# Patient Record
Sex: FEMALE | Race: WHITE | NOT HISPANIC OR LATINO | Employment: PART TIME | URBAN - METROPOLITAN AREA
[De-identification: names, ages, dates, MRNs, and addresses within clinical notes are randomized per-mention and may not be internally consistent; named-entity substitution may affect disease eponyms.]

---

## 2018-02-02 ENCOUNTER — OFFICE VISIT CONVERTED (OUTPATIENT)
Dept: PLASTIC SURGERY | Facility: CLINIC | Age: 38
End: 2018-02-02
Attending: PLASTIC SURGERY

## 2018-02-08 ENCOUNTER — PROCEDURE VISIT CONVERTED (OUTPATIENT)
Dept: PLASTIC SURGERY | Facility: CLINIC | Age: 38
End: 2018-02-08
Attending: PLASTIC SURGERY

## 2018-04-25 ENCOUNTER — OFFICE VISIT CONVERTED (OUTPATIENT)
Dept: INTERNAL MEDICINE | Facility: CLINIC | Age: 38
End: 2018-04-25
Attending: INTERNAL MEDICINE

## 2018-11-20 ENCOUNTER — OFFICE VISIT CONVERTED (OUTPATIENT)
Dept: INTERNAL MEDICINE | Facility: CLINIC | Age: 38
End: 2018-11-20
Attending: INTERNAL MEDICINE

## 2019-02-05 ENCOUNTER — CONVERSION ENCOUNTER (OUTPATIENT)
Dept: INTERNAL MEDICINE | Facility: CLINIC | Age: 39
End: 2019-02-05

## 2019-02-05 ENCOUNTER — OFFICE VISIT CONVERTED (OUTPATIENT)
Dept: INTERNAL MEDICINE | Facility: CLINIC | Age: 39
End: 2019-02-05
Attending: INTERNAL MEDICINE

## 2019-02-14 ENCOUNTER — HOSPITAL ENCOUNTER (OUTPATIENT)
Dept: OTHER | Facility: HOSPITAL | Age: 39
Discharge: HOME | End: 2019-02-14
Attending: INTERNAL MEDICINE

## 2019-02-22 ENCOUNTER — OFFICE VISIT CONVERTED (OUTPATIENT)
Dept: OTOLARYNGOLOGY | Facility: CLINIC | Age: 39
End: 2019-02-22
Attending: OTOLARYNGOLOGY

## 2019-03-20 LAB — FUNGUS CSF CULT: NORMAL

## 2019-04-02 ENCOUNTER — HOSPITAL ENCOUNTER (OUTPATIENT)
Dept: GENERAL RADIOLOGY | Facility: HOSPITAL | Age: 39
Discharge: HOME | End: 2019-04-02
Attending: OTOLARYNGOLOGY

## 2019-04-12 ENCOUNTER — OFFICE VISIT CONVERTED (OUTPATIENT)
Dept: OTOLARYNGOLOGY | Facility: CLINIC | Age: 39
End: 2019-04-12
Attending: OTOLARYNGOLOGY

## 2019-05-21 ENCOUNTER — OFFICE VISIT CONVERTED (OUTPATIENT)
Dept: INTERNAL MEDICINE | Facility: CLINIC | Age: 39
End: 2019-05-21
Attending: INTERNAL MEDICINE

## 2019-05-23 ENCOUNTER — HOSPITAL ENCOUNTER (OUTPATIENT)
Dept: LAB | Facility: HOSPITAL | Age: 39
Discharge: HOME | End: 2019-05-23
Attending: INTERNAL MEDICINE

## 2019-05-23 LAB
BASOPHILS # BLD AUTO: 0.05 10*3/UL (ref 0–0.2)
BASOPHILS NFR BLD AUTO: 1 % (ref 0–3)
CHOLEST SERPL-MCNC: 110 MG/DL (ref 107–200)
CHOLEST/HDLC SERPL: 2.2 {RATIO} (ref 3–6)
CONV ABS IMM GRAN: 0.01 10*3/UL (ref 0–0.2)
CONV IMMATURE GRAN: 0.2 % (ref 0–1.8)
DEPRECATED RDW RBC AUTO: 40 FL (ref 36.4–46.3)
EOSINOPHIL # BLD AUTO: 0.21 10*3/UL (ref 0–0.7)
EOSINOPHIL # BLD AUTO: 4.2 % (ref 0–7)
ERYTHROCYTE [DISTWIDTH] IN BLOOD BY AUTOMATED COUNT: 11.8 % (ref 11.7–14.4)
FOLATE SERPL-MCNC: 17.8 NG/ML (ref 4.8–20)
HBA1C MFR BLD: 13.2 G/DL (ref 12–16)
HCT VFR BLD AUTO: 39.4 % (ref 37–47)
HDLC SERPL-MCNC: 49 MG/DL (ref 40–60)
IRON SATN MFR SERPL: 20 % (ref 20–55)
IRON SERPL-MCNC: 79 UG/DL (ref 60–170)
LDLC SERPL CALC-MCNC: 51 MG/DL (ref 70–100)
LYMPHOCYTES # BLD AUTO: 1.24 10*3/UL (ref 1–5)
MCH RBC QN AUTO: 31.2 PG (ref 27–31)
MCHC RBC AUTO-ENTMCNC: 33.5 G/DL (ref 33–37)
MCV RBC AUTO: 93.1 FL (ref 81–99)
MONOCYTES # BLD AUTO: 0.28 10*3/UL (ref 0.2–1.2)
MONOCYTES NFR BLD AUTO: 5.7 % (ref 3–10)
NEUTROPHILS # BLD AUTO: 3.16 10*3/UL (ref 2–8)
NEUTROPHILS NFR BLD AUTO: 63.8 % (ref 30–85)
NRBC CBCN: 0 % (ref 0–0.7)
PLATELET # BLD AUTO: 230 10*3/UL (ref 130–400)
PMV BLD AUTO: 11 FL (ref 9.4–12.3)
RBC # BLD AUTO: 4.23 10*6/UL (ref 4.2–5.4)
T4 FREE SERPL-MCNC: 1.3 NG/DL (ref 0.9–1.8)
TIBC SERPL-MCNC: 398 UG/DL (ref 245–450)
TRANSFERRIN SERPL-MCNC: 278 MG/DL (ref 250–380)
TRIGL SERPL-MCNC: 49 MG/DL (ref 40–150)
TSH SERPL-ACNC: 1.29 M[IU]/L (ref 0.27–4.2)
VARIANT LYMPHS NFR BLD MANUAL: 25.1 % (ref 20–45)
VIT B12 SERPL-MCNC: 477 PG/ML (ref 211–911)
VLDLC SERPL-MCNC: 10 MG/DL (ref 5–37)
WBC # BLD AUTO: 4.95 10*3/UL (ref 4.8–10.8)

## 2019-08-16 ENCOUNTER — OFFICE VISIT CONVERTED (OUTPATIENT)
Dept: INTERNAL MEDICINE | Facility: CLINIC | Age: 39
End: 2019-08-16
Attending: INTERNAL MEDICINE

## 2019-08-16 ENCOUNTER — HOSPITAL ENCOUNTER (OUTPATIENT)
Dept: OTHER | Facility: HOSPITAL | Age: 39
Discharge: HOME | End: 2019-08-16
Attending: INTERNAL MEDICINE

## 2019-08-16 LAB
25(OH)D3 SERPL-MCNC: 58.3 NG/ML (ref 30–100)
ALBUMIN SERPL-MCNC: 5.2 G/DL (ref 3.5–5)
ALBUMIN/GLOB SERPL: 2 {RATIO} (ref 1.4–2.6)
ALP SERPL-CCNC: 47 U/L (ref 42–98)
ALT SERPL-CCNC: 13 U/L (ref 10–40)
AMYLASE SERPL-CCNC: 46 U/L (ref 30–110)
ANION GAP SERPL CALC-SCNC: 17 MMOL/L (ref 8–19)
AST SERPL-CCNC: 23 U/L (ref 15–50)
BASOPHILS # BLD AUTO: 0.05 10*3/UL (ref 0–0.2)
BASOPHILS NFR BLD AUTO: 1 % (ref 0–3)
BILIRUB SERPL-MCNC: 0.64 MG/DL (ref 0.2–1.3)
BUN SERPL-MCNC: 7 MG/DL (ref 5–25)
BUN/CREAT SERPL: 9 {RATIO} (ref 6–20)
CALCIUM SERPL-MCNC: 9.3 MG/DL (ref 8.7–10.4)
CHLORIDE SERPL-SCNC: 102 MMOL/L (ref 99–111)
CONV ABS IMM GRAN: 0.01 10*3/UL (ref 0–0.2)
CONV CO2: 25 MMOL/L (ref 22–32)
CONV IMMATURE GRAN: 0.2 % (ref 0–1.8)
CONV TOTAL PROTEIN: 7.8 G/DL (ref 6.3–8.2)
CREAT UR-MCNC: 0.8 MG/DL (ref 0.5–0.9)
CRP SERPL-MCNC: 0.3 MG/L (ref 0–5)
DEPRECATED RDW RBC AUTO: 39.7 FL (ref 36.4–46.3)
EOSINOPHIL # BLD AUTO: 0.14 10*3/UL (ref 0–0.7)
EOSINOPHIL # BLD AUTO: 2.9 % (ref 0–7)
ERYTHROCYTE [DISTWIDTH] IN BLOOD BY AUTOMATED COUNT: 11.5 % (ref 11.7–14.4)
ERYTHROCYTE [SEDIMENTATION RATE] IN BLOOD: 2 MM/H (ref 0–20)
FSH SERPL-ACNC: 8.8 M[IU]/ML
GFR SERPLBLD BASED ON 1.73 SQ M-ARVRAT: >60 ML/MIN/{1.73_M2}
GLOBULIN UR ELPH-MCNC: 2.6 G/DL (ref 2–3.5)
GLUCOSE SERPL-MCNC: 79 MG/DL (ref 65–99)
HCT VFR BLD AUTO: 40.4 % (ref 37–47)
HGB BLD-MCNC: 13.6 G/DL (ref 12–16)
LIPASE SERPL-CCNC: 25 U/L (ref 5–51)
LYMPHOCYTES # BLD AUTO: 1.46 10*3/UL (ref 1–5)
LYMPHOCYTES NFR BLD AUTO: 30.5 % (ref 20–45)
MCH RBC QN AUTO: 31.3 PG (ref 27–31)
MCHC RBC AUTO-ENTMCNC: 33.7 G/DL (ref 33–37)
MCV RBC AUTO: 93.1 FL (ref 81–99)
MONOCYTES # BLD AUTO: 0.23 10*3/UL (ref 0.2–1.2)
MONOCYTES NFR BLD AUTO: 4.8 % (ref 3–10)
NEUTROPHILS # BLD AUTO: 2.9 10*3/UL (ref 2–8)
NEUTROPHILS NFR BLD AUTO: 60.6 % (ref 30–85)
NRBC CBCN: 0 % (ref 0–0.7)
OSMOLALITY SERPL CALC.SUM OF ELEC: 287 MOSM/KG (ref 273–304)
PLATELET # BLD AUTO: 240 10*3/UL (ref 130–400)
PMV BLD AUTO: 10.8 FL (ref 9.4–12.3)
POTASSIUM SERPL-SCNC: 4.2 MMOL/L (ref 3.5–5.3)
RBC # BLD AUTO: 4.34 10*6/UL (ref 4.2–5.4)
SODIUM SERPL-SCNC: 140 MMOL/L (ref 135–147)
T4 FREE SERPL-MCNC: 1.3 NG/DL (ref 0.9–1.8)
TSH SERPL-ACNC: 1.87 M[IU]/L (ref 0.27–4.2)
WBC # BLD AUTO: 4.79 10*3/UL (ref 4.8–10.8)

## 2019-08-19 LAB
CONV ANTI NUCLEAR ANTIBODY WITH REFLEX: NEGATIVE
CONV CELIAC DISEASE AB-IGA: 193 MG/DL (ref 68–408)

## 2019-08-20 LAB — TTG IGA SER-ACNC: 0 U/ML (ref 0–3)

## 2019-09-05 ENCOUNTER — HOSPITAL ENCOUNTER (OUTPATIENT)
Dept: OTHER | Facility: HOSPITAL | Age: 39
Discharge: HOME | End: 2019-09-05
Attending: INTERNAL MEDICINE

## 2019-09-05 LAB
APPEARANCE UR: CLEAR
BILIRUB UR QL: NEGATIVE
COLOR UR: YELLOW
CONV COLLECTION SOURCE (UA): NORMAL
CONV UROBILINOGEN IN URINE BY AUTOMATED TEST STRIP: 0.2 {EHRLICHU}/DL (ref 0.1–1)
GLUCOSE UR QL: NEGATIVE MG/DL
HGB UR QL STRIP: NEGATIVE
KETONES UR QL STRIP: NEGATIVE MG/DL
LEUKOCYTE ESTERASE UR QL STRIP: NEGATIVE
NITRITE UR QL STRIP: NEGATIVE
PH UR STRIP.AUTO: 6.5 [PH] (ref 5–8)
PROT UR QL: NEGATIVE MG/DL
SP GR UR: 1.01 (ref 1–1.03)

## 2019-09-06 ENCOUNTER — HOSPITAL ENCOUNTER (OUTPATIENT)
Dept: OTHER | Facility: HOSPITAL | Age: 39
Discharge: HOME | End: 2019-09-06
Attending: INTERNAL MEDICINE

## 2019-09-06 LAB
C DIFF TOX B STL QL CT TISS CULT: NEGATIVE
CONV 027 TOXIN: NEGATIVE

## 2019-09-08 LAB — BACTERIA SPEC AEROBE CULT: NORMAL

## 2019-10-22 ENCOUNTER — OFFICE VISIT CONVERTED (OUTPATIENT)
Dept: GASTROENTEROLOGY | Facility: CLINIC | Age: 39
End: 2019-10-22
Attending: INTERNAL MEDICINE

## 2019-11-08 ENCOUNTER — HOSPITAL ENCOUNTER (OUTPATIENT)
Dept: GASTROENTEROLOGY | Facility: HOSPITAL | Age: 39
Setting detail: HOSPITAL OUTPATIENT SURGERY
Discharge: HOME | End: 2019-11-08
Attending: INTERNAL MEDICINE

## 2019-11-26 ENCOUNTER — OFFICE VISIT CONVERTED (OUTPATIENT)
Dept: GASTROENTEROLOGY | Facility: CLINIC | Age: 39
End: 2019-11-26
Attending: INTERNAL MEDICINE

## 2020-02-28 ENCOUNTER — OFFICE VISIT CONVERTED (OUTPATIENT)
Dept: INTERNAL MEDICINE | Facility: CLINIC | Age: 40
End: 2020-02-28
Attending: INTERNAL MEDICINE

## 2020-02-28 ENCOUNTER — CONVERSION ENCOUNTER (OUTPATIENT)
Dept: INTERNAL MEDICINE | Facility: CLINIC | Age: 40
End: 2020-02-28

## 2020-06-05 ENCOUNTER — TELEMEDICINE CONVERTED (OUTPATIENT)
Dept: INTERNAL MEDICINE | Facility: CLINIC | Age: 40
End: 2020-06-05
Attending: INTERNAL MEDICINE

## 2020-11-13 ENCOUNTER — OFFICE VISIT CONVERTED (OUTPATIENT)
Dept: INTERNAL MEDICINE | Facility: CLINIC | Age: 40
End: 2020-11-13
Attending: INTERNAL MEDICINE

## 2020-11-13 ENCOUNTER — HOSPITAL ENCOUNTER (OUTPATIENT)
Dept: OTHER | Facility: HOSPITAL | Age: 40
Discharge: HOME | End: 2020-11-13
Attending: INTERNAL MEDICINE

## 2020-11-15 LAB — BACTERIA SPEC AEROBE CULT: NORMAL

## 2020-11-23 LAB
Lab: NOT DETECTED
SPECIMEN SOURCE: NORMAL
UREAPLASMA UREALYTICUM: NOT DETECTED

## 2020-12-22 ENCOUNTER — OFFICE VISIT CONVERTED (OUTPATIENT)
Dept: ORTHOPEDIC SURGERY | Facility: CLINIC | Age: 40
End: 2020-12-22
Attending: ORTHOPAEDIC SURGERY

## 2020-12-26 ENCOUNTER — HOSPITAL ENCOUNTER (OUTPATIENT)
Dept: OTHER | Facility: HOSPITAL | Age: 40
Discharge: HOME | End: 2020-12-26
Attending: INTERNAL MEDICINE

## 2020-12-31 ENCOUNTER — OFFICE VISIT CONVERTED (OUTPATIENT)
Dept: INTERNAL MEDICINE | Facility: CLINIC | Age: 40
End: 2020-12-31
Attending: PHYSICIAN ASSISTANT

## 2020-12-31 ENCOUNTER — HOSPITAL ENCOUNTER (OUTPATIENT)
Dept: OTHER | Facility: HOSPITAL | Age: 40
Discharge: HOME | End: 2020-12-31
Attending: PHYSICIAN ASSISTANT

## 2021-01-01 LAB — SARS-COV-2 RNA SPEC QL NAA+PROBE: NOT DETECTED

## 2021-01-02 LAB — BACTERIA SPEC AEROBE CULT: NORMAL

## 2021-01-08 ENCOUNTER — OFFICE VISIT CONVERTED (OUTPATIENT)
Dept: INTERNAL MEDICINE | Facility: CLINIC | Age: 41
End: 2021-01-08
Attending: INTERNAL MEDICINE

## 2021-01-08 ENCOUNTER — HOSPITAL ENCOUNTER (OUTPATIENT)
Dept: OTHER | Facility: HOSPITAL | Age: 41
Discharge: HOME | End: 2021-01-08
Attending: INTERNAL MEDICINE

## 2021-01-19 ENCOUNTER — HOSPITAL ENCOUNTER (OUTPATIENT)
Dept: OTHER | Facility: HOSPITAL | Age: 41
Discharge: HOME | End: 2021-01-19
Attending: INTERNAL MEDICINE

## 2021-02-05 ENCOUNTER — OFFICE VISIT CONVERTED (OUTPATIENT)
Dept: INTERNAL MEDICINE | Facility: CLINIC | Age: 41
End: 2021-02-05
Attending: INTERNAL MEDICINE

## 2021-03-26 ENCOUNTER — CONVERSION ENCOUNTER (OUTPATIENT)
Dept: INTERNAL MEDICINE | Facility: CLINIC | Age: 41
End: 2021-03-26

## 2021-03-26 ENCOUNTER — OFFICE VISIT CONVERTED (OUTPATIENT)
Dept: INTERNAL MEDICINE | Facility: CLINIC | Age: 41
End: 2021-03-26
Attending: INTERNAL MEDICINE

## 2021-04-20 ENCOUNTER — HOSPITAL ENCOUNTER (OUTPATIENT)
Dept: GENERAL RADIOLOGY | Facility: HOSPITAL | Age: 41
Discharge: HOME | End: 2021-04-20
Attending: INTERNAL MEDICINE

## 2021-04-30 ENCOUNTER — HOSPITAL ENCOUNTER (OUTPATIENT)
Dept: GENERAL RADIOLOGY | Facility: HOSPITAL | Age: 41
Discharge: HOME | End: 2021-04-30
Attending: INTERNAL MEDICINE

## 2021-05-10 NOTE — H&P
History and Physical      Patient Name: Christal Mcduffie   Patient ID: [id number]   Sex: Female   YOB: 1980    Primary Care Provider: Liz Milligan MD   Referring Provider: Mari Leong DO    Visit Date: December 22, 2020    Provider: Castro Aiken MD   Location: [facility]   Location Address: [address] [place] [address]  [id number]   Location Phone: [phone]          Chief Complaint  · Left wrist pain       History Of Present Illness  Christal Mcduffie is a 40 year old /White female who presents today to [facility].      Patient presents today for an evaluation of left wrist pain. Her symptoms have been present for several weeks to months. Symptoms have gradually gotten worse. She localizes her pain around the base of her thumb around the MCP region and at the ulnar wrist. Patient denies any trauma or injury to her wrist. 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 Patient denies any conservative treatment for her left wrist. She denies any numbness and tingling to her wrist and hand.       Past Medical History  Acute maxillary sinusitis, recurrence not specified; Allergic Rhinitis; Anxiety; Chronic rhinosinusitis; Depression; Facial aging; GERD (gastroesophageal reflux disease); Irritable bowel syndrome; Keloid; Migraine Headaches; Myofascial muscle pain; Night sweats; Reflux         Past Surgical History  Breast augmentation surgery, bilateral; C-sections; Cesarian Section; Colonoscopy; D and C; EGD; Hysterectomy; Joint Surgery; Knee surgery; Metal implants         Medication List  buspirone 10 mg oral tablet; Calcium 500 500 mg calcium (1,250 mg) oral tablet; Pepcid 40 mg oral tablet; Prevacid oral; sertraline 50 mg oral tablet         Allergy List  NO KNOWN DRUG ALLERGIES; NO KNOWN DRUG ALLERGIES       Allergies Reconciled  Family Medical History  Heart Disease; Hypertension; Colon Cancer; Osteoporosis         Social  "History  Alcohol (Current some day); Alcohol Use (Current some day); Exercises regularly; Homemaker.; lives with children; lives with spouse; ; .; Recreational Drug Use (Never); Tobacco (Never); Working         Immunizations  Name Date Admin   Influenza 10/15/2019         Review of Systems  · Constitutional  o Denies  o : fever, chills, weight loss  · Cardiovascular  o Denies  o : chest pain, shortness of breath  · Gastrointestinal  o Denies  o : liver disease, heartburn, nausea, blood in stools  · Genitourinary  o Denies  o : painful urination, blood in urine  · Integument  o Denies  o : rash, itching  · Neurologic  o Denies  o : headache, weakness, loss of consciousness  · Musculoskeletal  o Denies  o : painful, swollen joints  · Psychiatric  o Denies  o : drug/alcohol addiction, anxiety, depression      Vitals  Date Time BP Position Site L\R Cuff Size HR RR TEMP (F) WT  HT  BMI kg/m2 BSA m2 O2 Sat FR L/min FiO2        12/22/2020 02:42 PM      107 - R   131lbs 8oz 5'  8\" 19.99 1.69 98 %            Physical Examination  · Constitutional  o Appearance  o : well developed, well-nourished, no obvious deformities present  · Head and Face  o Head  o :   § Inspection  § : normocephalic  o Face  o :   § Inspection  § : no facial lesions  · Eyes  o Conjunctivae  o : conjunctivae normal  o Sclerae  o : sclerae white  · Ears, Nose, Mouth and Throat  o Ears  o :   § External Ears  § : appearance within normal limits  § Hearing  § : intact  o Nose  o :   § External Nose  § : appearance normal  · Neck  o Inspection/Palpation  o : normal appearance  o Range of Motion  o : full range of motion  · Respiratory  o Respiratory Effort  o : breathing unlabored  o Inspection of Chest  o : normal appearance  o Auscultation of Lungs  o : no audible wheezing or rales  · Cardiovascular  o Heart  o : regular rate  · Gastrointestinal  o Abdominal Examination  o : soft and non-tender  · Skin and Subcutaneous Tissue  o General " Inspection  o : intact, no rashes  · Psychiatric  o General  o : Alert and oriented x3  o Judgement and Insight  o : judgment and insight intact  o Mood and Affect  o : mood normal, affect appropriate  · Left Wrist  o Inspection  o : Sensation grossly intact. Neurovascular intact. Skin intact. Tender to palpation of the ulnar styloid and ECU tendon. Pain with range of motion of the wrist. Full range of motion. Strength 5 out of 5. Tender to palpation of the thumb metacarpal. Negative grind test. Negative Finkelstein. Negative carpal tunnel testing. No swelling, skin discoloration or atrophy.   · In Office Procedures  o View  o : AP/LATERAL  o Site  o : Left wrist   o Indication  o : Left wrist pain   o Study  o : X-rays ordered, taken in the office, and reviewed today.  o Xray  o : No acute osseous abnormality. Mild ulnar/positive variance.   o Comparative Data  o : No comparative data found          Assessment  · Left wrist pain     719.43/M25.532  · Left Wrist/Thumb Tendinitis     726.90/M77.9      Plan  · Orders  o Wrist (Left) 2 views X-Ray [facility] ([id number]) - 719.43/M25.532 - 12/22/2020  · Medications  o Medications have been Reconciled  o Transition of Care or Provider Policy  · Instructions  o Dr. Aiken saw and examined the patient and agrees with plan.   o X-rays reviewed by Dr. Aiken.  o Reviewed the patient's Past Medical, Social, and Family history as well as the ROS at today's visit, no changes.  o Call or return if worsening symptoms.  o Follow Up PRN.  o This note was transcribed by Muriel Fabian. chica  o Discussed diagnosis and treatment options with the patient. Patient given a prescription for Moloxican 15mg daily. Patient was also given a wrist pain. If pain worsens or doesnt improve we will consider getting an MRI.             Electronically Signed by: Muriel Fabian-, Other -Author on December 31, 2020 10:22:24 AM  Electronically Co-signed by: Castro Aiken MD  -Reviewer on January 1, 2021 10:05:49 PM

## 2021-05-13 NOTE — PROGRESS NOTES
"   Progress Note      Patient Name: Christal Mcduffie   Patient ID: [id number]   Sex: Female   YOB: 1980    Primary Care Provider: Liz Milligan MD   Referring Provider: Mari Leong DO    Visit Date: June 5, 2020    Provider: Liz Milligan MD   Location: [facility]   Location Address: [address] [place] [address]  [id number]   Location Phone: [phone]          Chief Complaint  · \"im just doing a follow up\"      History Of Present Illness  Video Conferencing Visit  Christal Mcduffie is a 40 year old /White female who is presenting for evaluation via video conferencing via [employer]. Verbal consent obtained before beginning visit.   The following staff were present during this visit: Liz Milligan MD   Christal Mcduffie is a 40 year old /White female who presents for evaluation and treatment of:      states that she is doing well on zoloft 50mg  however still having a little trouble with breakthrough anxiety at times    states that her cramping is getting worse  she has been trying orlissa  she is working with her gynecologist about this         Past Medical History  Disease Name Date Onset Notes   Acute maxillary sinusitis, recurrence not specified 03/28/2016 will try amoxil + clinda   Allergic Rhinitis --  --    Anxiety --  --    Chronic rhinosinusitis --  --    Depression --  --    Facial aging --  --    GERD (gastroesophageal reflux disease) --  --    Irritable bowel syndrome --  --    Keloid --  --    Migraine Headaches --  --    Myofascial muscle pain --  --    Night sweats --  --          Past Surgical History  Procedure Name Date Notes   Breast augmentation surgery, bilateral --  --    C-sections 2009, 2013 2009 twins   Colonoscopy 2019 --    D and C 2008, 2003 --    EGD 2019 --    Knee surgery [id number] Right knee 6 surgeries         Medication List  Name Date Started Instructions   Calcium 500 500 mg calcium (1,250 mg) oral tablet  take 1 " tablet by oral route daily   Orilissa 150 mg oral tablet  take 1 tablet (150 mg) by oral route once daily at approximately the same time each day   Pepcid 40 mg oral tablet  take 1 tablet (40 mg) by oral route once daily at bedtime   Prevacid oral  --    sertraline 50 mg oral tablet 05/26/2020 TAKE 1 TABLET BY MOUTH EVERY DAY         Allergy List  Allergen Name Date Reaction Notes   NO KNOWN DRUG ALLERGIES --  --  --          Family Medical History  Disease Name Relative/Age Notes   Heart Disease Grandfather (paternal)/   --    Hypertension Mother/   --    Colon Cancer Grandfather (paternal)/   --          Social History  Finding Status Start/Stop Quantity Notes   Alcohol Current some day --/-- 3 drinks a week --    Exercises regularly --  --/-- --  --    lives with spouse --  --/-- --  --     --  --/-- --  --    Tobacco Never --/-- --  --    Working --  --/-- --  --          Immunizations  NameDate Admin Mfg Trade Name Lot Number Route Inj VIS Given VIS Publication   [id number]/15/2019 SKB Fluarix, quadrivalent, preservative free  NE NE     Comments:          Review of Systems  · Constitutional  o Denies  o : fever, fatigue, weight loss, weight gain  · Cardiovascular  o Denies  o : lower extremity edema, claudication, chest pressure, palpitations  · Respiratory  o Denies  o : shortness of breath, wheezing, cough, hemoptysis, dyspnea on exertion  · Gastrointestinal  o Denies  o : nausea, vomiting, diarrhea, constipation, abdominal pain      Physical Examination     Gen: well-nourished, no acute distress  HENT: atraumatic, normocephalic  Eyes: extraocular movements intact, no scleral icterus  Lung: breathing comfortably, no cough  Skin: no visible rash, no lesions  Neuro: grossly oriented to person, place, and time. no facial droop   Psych: normal mood and affect             Assessment  · Anxiety     300.02/F41.1  cont current meds  will add buspirone  · Endometriosis     617.9/N80.9  cont to work with  gyn  will try bentyl and see if it helps    Problems Reconciled  Plan  · Orders  o ACO-39: Current medications updated and reviewed () - - 06/05/2020  · Medications  o buspirone 10 mg oral tablet   SIG: take 1 tablet (10 mg) by oral route 2 times per day   DISP: (60) tablets with 0 refills  Prescribed on 06/05/2020     o dicyclomine 20 mg oral tablet   SIG: take 1 tablet (20 mg) by oral route 3 times per day as needed for cramping   DISP: (30) tablets with 0 refills  Prescribed on 06/05/2020     o Medications have been Reconciled  o Transition of Care or Provider Policy  · Instructions  o Patient was educated/instructed on their diagnosis, treatment and medications prior to discharge from the clinic today.            Electronically Signed by: Liz Milligan MD -Author on June 14, 2020 12:16:59 AM

## 2021-05-13 NOTE — PROGRESS NOTES
Progress Note      Patient Name: Christal Mcduffie   Patient ID: [id number]   Sex: Female   YOB: 1980    Primary Care Provider: Liz Milligan MD   Referring Provider: Mari Leong DO    Visit Date: November 13, 2020    Provider: Liz Milligan MD   Location: [facility]   Location Address: [address] [place] [address], [address]  [place], [place]  [id number]   Location Phone: [phone]          Chief Complaint  · Pt states she is here for a follow up after hysterectomy.       History Of Present Illness  Christal Mcduffie is a 40 year old /White female who presents for evaluation and treatment of:      states that her pain is much better    cellulitis  she was on flagyl and Levaquin  then she got bv after this    she then got granulation tissue  I discussed with her gynecologist who states that she feels it is related to a vaginal cuff issue and recommends silver nitrate again    she also has more discharge   she tried clindagel and metrogel but then she felt like her discharge was going back         Past Medical History  Disease Name Date Onset Notes   Acute maxillary sinusitis, recurrence not specified 03/28/2016 will try amoxil + clinda   Allergic Rhinitis --  --    Anxiety --  --    Chronic rhinosinusitis --  --    Depression --  --    Facial aging --  --    GERD (gastroesophageal reflux disease) --  --    Irritable bowel syndrome --  --    Keloid --  --    Migraine Headaches --  --    Myofascial muscle pain --  --    Night sweats --  --          Past Surgical History  Procedure Name Date Notes   Breast augmentation surgery, bilateral --  --    C-sections 2009, 2013 2009 twins   Colonoscopy 2019 --    D and C 2008, 2003 --    EGD 2019 --    Knee surgery [id number] Right knee 6 surgeries         Medication List  Name Date Started Instructions   buspirone 10 mg oral tablet 11/13/2020 take 1 tablet (10 mg) by oral route 2 times per day   Calcium 500 500 mg calcium (1,250 mg) oral  tablet  take 1 tablet by oral route daily   dicyclomine 20 mg oral tablet 06/05/2020 take 1 tablet (20 mg) by oral route 3 times per day as needed for cramping   Flagyl 500 mg oral tablet 11/20/2020 Take 1 tablet by mouth twice a day for 10 days   Pepcid 40 mg oral tablet  take 1 tablet (40 mg) by oral route once daily at bedtime   Prevacid oral  --    sertraline 50 mg oral tablet 08/21/2020 TAKE 1 TABLET BY MOUTH EVERY DAY         Allergy List  Allergen Name Date Reaction Notes   NO KNOWN DRUG ALLERGIES --  --  --        Allergies Reconciled  Family Medical History  Disease Name Relative/Age Notes   Heart Disease Grandfather (paternal)/   --    Hypertension Mother/   --    Colon Cancer Grandfather (paternal)/   --          Social History  Finding Status Start/Stop Quantity Notes   Alcohol Current some day --/-- 3 drinks a week --    Exercises regularly --  --/-- --  --    lives with spouse --  --/-- --  --     --  --/-- --  --    Tobacco Never --/-- --  --    Working --  --/-- --  --          Immunizations  NameDate Admin Mfg Trade Name Lot Number Route Inj VIS Given VIS Publication   [id number]/15/2019 SKB Fluarix, quadrivalent, preservative free  NE NE     Comments:          Review of Systems  · Constitutional  o Denies  o : fatigue, night sweats  · Eyes  o Denies  o : double vision, blurred vision  · HENT  o Denies  o : vertigo, recent head injury  · Breasts  o Denies  o : abnormal changes in breast size, additional breast symptoms except as noted in the HPI  · Cardiovascular  o Denies  o : chest pain, irregular heart beats  · Respiratory  o Denies  o : shortness of breath, productive cough  · Gastrointestinal  o Denies  o : nausea, vomiting  · Genitourinary  o Denies  o : dysuria, urinary retention  · Integument  o Denies  o : hair growth change, new skin lesions  · Neurologic  o Denies  o : altered mental status, seizures  · Musculoskeletal  o Denies  o : joint swelling, limitation of  "motion  · Endocrine  o Denies  o : cold intolerance, heat intolerance  · Heme-Lymph  o Denies  o : petechiae, lymph node enlargement or tenderness  · Allergic-Immunologic  o Denies  o : frequent illnesses      Vitals  Date Time BP Position Site L\R Cuff Size HR RR TEMP (F) WT  HT  BMI kg/m2 BSA m2 O2 Sat FR L/min FiO2 HC       11/13/2020 09:21 /60 Sitting    82 - R  97.5 126lbs 6oz 5'  8\" 19.22 1.66 100 %  21%          Physical Examination  · Constitutional  o Appearance  o : no acute distress, well-nourished  · Head and Face  o Head  o :   § Inspection  § : atraumatic, normocephalic  · Eyes  o Eyes  o : extraocular movements intact, no scleral icterus, no conjunctival injection  · Respiratory  o Respiratory Effort  o : breathing comfortably, symmetric chest rise  o Auscultation of Lungs  o : clear to asculatation bilaterally, no wheezes, rales, or rhonchii  · Cardiovascular  o Heart  o :   § Auscultation of Heart  § : regular rate and rhythm, no murmurs, rubs, or gallops  o Peripheral Vascular System  o :   § Extremities  § : no edema  · Neurologic  o Mental Status Examination  o :   § Orientation  § : grossly oriented to person, place and time  o Gait and Station  o :   § Gait Screening  § : normal gait  · Psychiatric  o General  o : normal mood and affect     Vaginal exam without cervix however there is approximately 1.5 cm worth of granulation tissue in the area where cervix would be it is red and friable after discussion with her gynecologist decided to silver nitrate this area which she tolerated well  There was also a significant amount of whitish-yellow discharge which was cultured exam was otherwise normal               Assessment  · Vaginal discharge     623.5/N89.8  Silver nitrate applied to granulation tissue  Swabs obtained  We will treat based on these results    Problems Reconciled  Plan  · Orders  o ACO-39: Current medications updated and reviewed (0814M, ) - - 11/13/2020  o Vaginal Screen " (Candida, Gardnerella & Trichomonas) (55810) - 623.5/N89.8 - 11/13/2020  o Vaginal culture (11136) - 623.5/N89.8 - 11/13/2020  o Ureaplasma and Mycoplasma PCR Genital Swab [facility] (32053) - 623.5/N89.8 - 11/13/2020  · Medications  o buspirone 10 mg oral tablet   SIG: take 1 tablet (10 mg) by oral route 2 times per day   DISP: (180) Tablet with 1 refills  Adjusted on 11/13/2020     o Medications have been Reconciled  o Transition of Care or Provider Policy  · Instructions  o Patient was educated/instructed on their diagnosis, treatment and medications prior to discharge from the clinic today.            Electronically Signed by: Liz Milligan MD -Author on December 13, 2020 02:33:01 PM

## 2021-05-14 VITALS
BODY MASS INDEX: 19.17 KG/M2 | WEIGHT: 126.5 LBS | HEIGHT: 68 IN | HEART RATE: 87 BPM | DIASTOLIC BLOOD PRESSURE: 86 MMHG | OXYGEN SATURATION: 97 % | SYSTOLIC BLOOD PRESSURE: 112 MMHG | TEMPERATURE: 97.6 F

## 2021-05-14 VITALS
SYSTOLIC BLOOD PRESSURE: 102 MMHG | TEMPERATURE: 96.8 F | HEIGHT: 68 IN | WEIGHT: 131.12 LBS | BODY MASS INDEX: 19.87 KG/M2 | RESPIRATION RATE: 16 BRPM | HEART RATE: 81 BPM | DIASTOLIC BLOOD PRESSURE: 62 MMHG | OXYGEN SATURATION: 98 %

## 2021-05-14 VITALS
OXYGEN SATURATION: 99 % | DIASTOLIC BLOOD PRESSURE: 80 MMHG | HEIGHT: 68 IN | TEMPERATURE: 98.9 F | RESPIRATION RATE: 16 BRPM | HEART RATE: 83 BPM | SYSTOLIC BLOOD PRESSURE: 124 MMHG | BODY MASS INDEX: 19.63 KG/M2 | WEIGHT: 129.5 LBS

## 2021-05-14 VITALS — BODY MASS INDEX: 19.93 KG/M2 | WEIGHT: 131.5 LBS | OXYGEN SATURATION: 98 % | HEART RATE: 107 BPM | HEIGHT: 68 IN

## 2021-05-14 VITALS
HEART RATE: 70 BPM | HEIGHT: 68 IN | BODY MASS INDEX: 19.76 KG/M2 | TEMPERATURE: 97.8 F | WEIGHT: 130.37 LBS | OXYGEN SATURATION: 100 % | DIASTOLIC BLOOD PRESSURE: 82 MMHG | SYSTOLIC BLOOD PRESSURE: 106 MMHG

## 2021-05-14 VITALS
HEIGHT: 68 IN | WEIGHT: 126.37 LBS | BODY MASS INDEX: 19.15 KG/M2 | TEMPERATURE: 97.5 F | SYSTOLIC BLOOD PRESSURE: 109 MMHG | DIASTOLIC BLOOD PRESSURE: 60 MMHG | OXYGEN SATURATION: 100 % | HEART RATE: 82 BPM

## 2021-05-14 NOTE — PROGRESS NOTES
"   Progress Note      Patient Name: Christal Mcduffie   Patient ID: [id number]   Sex: Female   YOB: 1980    Primary Care Provider: Liz Milligan MD   Referring Provider: Mari Leong DO    Visit Date: January 8, 2021    Provider: Liz Milligan MD   Location: [facility]   Location Address: [address] [place] [address]  [id number]   Location Phone: [phone]          Chief Complaint  · \"I am still having vaginal discharge\"      History Of Present Illness  Christal Mcduffie is a 40 year old /White female who presents for evaluation and treatment of:      vagianl discharge    states that things got better  however the discharge came back    the discharge didn't change at all after coming back  no new partners    abd pain is improved       Past Medical History  Disease Name Date Onset Notes   Acute maxillary sinusitis, recurrence not specified 03/28/2016 will try amoxil + clinda   Allergic Rhinitis --  --    Anxiety --  --    Chronic rhinosinusitis --  --    Depression --  --    Facial aging --  --    GERD (gastroesophageal reflux disease) --  --    Irritable bowel syndrome --  --    Keloid --  --    Migraine Headaches --  --    Myofascial muscle pain --  --    Night sweats --  --    Reflux --  --          Past Surgical History  Procedure Name Date Notes   Breast augmentation surgery, bilateral --  --    C-sections 2009, 2013 2009 twins   Cesarian Section --  --    Colonoscopy 2019 --    D and C 2008, 2003 --    EGD 2019 --    Hysterectomy --  --    Joint Surgery --  --    Knee surgery [id number] Right knee 6 surgeries   Metal implants --  --          Medication List  Name Date Started Instructions   buspirone 10 mg oral tablet 11/13/2020 take 1 tablet (10 mg) by oral route 2 times per day   Calcium 500 500 mg calcium (1,250 mg) oral tablet  take 1 tablet by oral route daily   magnesium 250 mg oral tablet  take 1 tablet by oral route daily   metronidazole 500 " "mg oral tablet 01/15/2021 Take 1 tablet by mouth twice a day for 10 days   Pepcid 40 mg oral tablet  take 1 tablet (40 mg) by oral route once daily at bedtime   Prevacid oral  --    sertraline 50 mg oral tablet 01/08/2021 TAKE 1 TABLET BY MOUTH EVERY DAY         Allergy List  Allergen Name Date Reaction Notes   NO KNOWN DRUG ALLERGIES --  --  --        Allergies Reconciled  Family Medical History  Disease Name Relative/Age Notes   Heart Disease Grandfather (paternal)/   --    Hypertension Mother/   --    Colon Cancer Grandfather (paternal)/   --    Osteoporosis Mother/   Mother         Social History  Finding Status Start/Stop Quantity Notes   Alcohol Current some day --/-- 3 drinks a week --    Alcohol Use Current some day --/-- --  drinks weekly, less than 1 drink per day, has been drinking for 11-20 years   Exercises regularly --  --/-- --  --    Homemaker. --  --/-- --  --    lives with children --  --/-- --  --    lives with spouse --  --/-- --  --     --  --/-- --  --    . --  --/-- --  --    Recreational Drug Use Never --/-- --  no   Tobacco Never --/-- --  never smoker   Working --  --/-- --  --          Immunizations  NameDate Admin Mfg Trade Name Lot Number Route Inj VIS Given VIS Publication   [id number]/15/2019 SKB Fluarix, quadrivalent, preservative free  NE NE     Comments:          Review of Systems  · Constitutional  o Denies  o : fever, fatigue, weight loss, weight gain  · Cardiovascular  o Denies  o : lower extremity edema, claudication, chest pressure, palpitations  · Respiratory  o Denies  o : shortness of breath, wheezing, frequent cough, hemoptysis, dyspnea on exertion  · Gastrointestinal  o Denies  o : nausea, vomiting, diarrhea, constipation, abdominal pain      Vitals  Date Time BP Position Site L\R Cuff Size HR RR TEMP (F) WT  HT  BMI kg/m2 BSA m2 O2 Sat FR L/min FiO2        11/13/2020 09:21 /60 Sitting    82 - R  97.5 126lbs 6oz 5'  8\" 19.22 1.66 100 %  21%  " "  12/22/2020 02:42 PM      107 - R   131lbs 8oz 5'  8\" 19.99 1.69 98 %      12/31/2020 09:31 /80 Sitting    83 - R 16 98.9 129lbs 8oz 5'  8\" 19.69 1.68 99 %      01/08/2021 08:49 /86 Sitting    87 - R  97.6 126lbs 8oz 5'  8\" 19.23 1.66 97 %  21%          Physical Examination  · Constitutional  o Appearance  o : no acute distress, well-nourished  · Head and Face  o Head  o :   § Inspection  § : atraumatic, normocephalic  · Eyes  o Eyes  o : extraocular movements intact, no scleral icterus, no conjunctival injection  · Respiratory  o Respiratory Effort  o : breathing comfortably, symmetric chest rise  o Auscultation of Lungs  o : clear to asculatation bilaterally, no wheezes, rales, or rhonchii  · Cardiovascular  o Heart  o :   § Auscultation of Heart  § : regular rate and rhythm, no murmurs, rubs, or gallops  o Peripheral Vascular System  o :   § Extremities  § : no edema  · Neurologic  o Mental Status Examination  o :   § Orientation  § : grossly oriented to person, place and time  o Gait and Station  o :   § Gait Screening  § : normal gait  · Psychiatric  o General  o : normal mood and affect     vaginal exam with area of erythematous, friable tissue around vaginal cuff, smaller and less friable since last visit    silver nitrate applied to this area in 2 passes of 5 seconds each           Assessment  · Vaginal discharge     623.5/N89.8  will reswab and retreat with silver nitrate    Problems Reconciled  Plan  · Orders  o ACO-39: Current medications updated and reviewed (, 1159F) - - 01/08/2021  o Vaginal Screen (Candida, Gardnerella & Trichomonas) (73701) - 623.5/N89.8 - 01/08/2021  · Medications  o sertraline 50 mg oral tablet   SIG: TAKE 1 TABLET BY MOUTH EVERY DAY   DISP: (90) Tablet with 1 refills  Refilled on 01/08/2021     o Medications have been Reconciled  o Transition of Care or Provider Policy  · Instructions  o Patient was educated/instructed on their diagnosis, treatment and " medications prior to discharge from the clinic today.  · Disposition  o 1 Month Follow Up            Electronically Signed by: Liz Milligan MD -Author on January 24, 2021 10:09:38 AM

## 2021-05-14 NOTE — PROGRESS NOTES
"   Progress Note      Patient Name: Christal Mcduffie   Patient ID: [id number]   Sex: Female   YOB: 1980    Primary Care Provider: Liz Milligan MD   Referring Provider: Mari Leong DO    Visit Date: February 5, 2021    Provider: Liz Milligan MD   [facility]: [facility]   Location Address: [address] [place] [address]  [id number]   Location Phone: [phone]          Chief Complaint  · f/u     \"for vaginal discharge\"       History Of Present Illness  Christal Mcduffie is a 40 year old /White female who presents for evaluation and treatment of:      chronic issues.    anxiety-  states that she does like the buspar in the AM  in the afternoon she is still dealing with it in the afternoon, she tried the buspar but it made her very tired    sHe is still noticing vaginal discharge  It is about the same as previous               Past Medical History  Disease Name Date Onset Notes   Acute maxillary sinusitis, recurrence not specified 03/28/2016 will try amoxil + clinda   Allergic Rhinitis --  --    Anxiety --  --    Chronic rhinosinusitis --  --    Depression --  --    Facial aging --  --    GERD (gastroesophageal reflux disease) --  --    Irritable bowel syndrome --  --    Keloid --  --    Migraine Headaches --  --    Myofascial muscle pain --  --    Night sweats --  --    Reflux --  --          Past Surgical History  Procedure Name Date Notes   Breast augmentation surgery, bilateral --  --    C-sections 2009, 2013 2009 twins   Cesarian Section --  --    Colonoscopy 2019 --    D and C 2008, 2003 --    EGD 2019 --    Hysterectomy --  --    Joint Surgery --  --    Knee surgery [id number] Right knee 6 surgeries   Metal implants --  --          Medication List  Name Date Started Instructions   buspirone 10 mg oral tablet 11/13/2020 take 1 tablet (10 mg) by oral route 2 times per day   Calcium 500 500 mg calcium (1,250 mg) oral tablet  take 1 tablet by oral route " "daily   magnesium 250 mg oral tablet  take 1 tablet by oral route daily   Pepcid 40 mg oral tablet  take 1 tablet (40 mg) by oral route once daily at bedtime   Prevacid oral  --    sertraline 50 mg oral tablet 01/08/2021 TAKE 1 TABLET BY MOUTH EVERY DAY         Allergy List  Allergen Name Date Reaction Notes   NO KNOWN DRUG ALLERGIES --  --  --        Allergies Reconciled  Family Medical History  Disease Name Relative/Age Notes   Heart Disease Grandfather (paternal)/   --    Hypertension Mother/   --    Colon Cancer Grandfather (paternal)/   --    Osteoporosis Mother/   Mother         Social History  Finding Status Start/Stop Quantity Notes   Alcohol Current some day --/-- 3 drinks a week --    Alcohol Use Current some day --/-- --  drinks weekly, less than 1 drink per day, has been drinking for 11-20 years   Exercises regularly --  --/-- --  --    Homemaker. --  --/-- --  --    lives with children --  --/-- --  --    lives with spouse --  --/-- --  --     --  --/-- --  --    . --  --/-- --  --    Recreational Drug Use Never --/-- --  no   Tobacco Never --/-- --  never smoker   Working --  --/-- --  --          Immunizations  NameDate Admin Mfg Trade Name Lot Number Route Inj VIS Given VIS Publication   Ylvgaxfds12/15/2019 SKB Fluarix, quadrivalent, preservative free  NE NE     Comments:          Vitals  Date Time BP Position Site L\R Cuff Size HR RR TEMP (F) WT  HT  BMI kg/m2 BSA m2 O2 Sat FR L/min FiO2 HC       11/13/2020 09:21 /60 Sitting    82 - R  97.5 126lbs 6oz 5'  8\" 19.22 1.66 100 %  21%    12/31/2020 09:31 /80 Sitting    83 - R 16 98.9 129lbs 8oz 5'  8\" 19.69 1.68 99 %      01/08/2021 08:49 /86 Sitting    87 - R  97.6 126lbs 8oz 5'  8\" 19.23 1.66 97 %  21%    02/05/2021 08:30 /62 Sitting    81 - R 16 96.8 131lbs 2oz 5'  8\" 19.94 1.69 98 %  21%          Physical Examination  · Constitutional  o Appearance  o : no acute distress, well-nourished  · Head and " Face  o Head  o :   § Inspection  § : atraumatic, normocephalic  · Eyes  o Eyes  o : extraocular movements intact, no scleral icterus, no conjunctival injection  · Respiratory  o Respiratory Effort  o : breathing comfortably, symmetric chest rise  o Auscultation of Lungs  o : clear to asculatation bilaterally, no wheezes, rales, or rhonchii  · Cardiovascular  o Heart  o :   § Auscultation of Heart  § : regular rate and rhythm, no murmurs, rubs, or gallops  o Peripheral Vascular System  o :   § Extremities  § : no edema  · Neurologic  o Mental Status Examination  o :   § Orientation  § : grossly oriented to person, place and time  o Gait and Station  o :   § Gait Screening  § : normal gait  · Psychiatric  o General  o : normal mood and affect     Vaginal exam with significant granulation tissue           Assessment  · Anxiety     300.02/F41.1  Doing okay but will try adding Wellbutrin and see how she does with this  · Vaginal discharge     623.5/N89.8  Will refer back to GYN as she continues to have significant granulation tissue    Problems Reconciled  Plan  · Orders  o ACO-14: Influenza immunization administered or previously received [facility] () - - 02/05/2021   previously administered  o ACO-39: Current medications updated and reviewed (, 1159F) - - 02/05/2021  · Medications  o Wellbutrin  mg oral tablet extended release 24 hr   SIG: take 1 tablet (150 mg) by oral route once daily   DISP: (90) Tablet with 0 refills  Prescribed on 02/05/2021     o Metrogel Vaginal 0.75 % vaginal gel   SIG: insert 1 applicatorful (37.5 mg) by vaginal route once daily at bedtime for 5 days   DISP: (1) Applicatorful with 0 refills  Prescribed on 02/05/2021     o Medications have been Reconciled  o Transition of Care or Provider Policy  · Instructions  o Patient was educated/instructed on their diagnosis, treatment and medications prior to discharge from the clinic today.  · Disposition  o 6 Week Follow  Up            Electronically Signed by: Liz Milligan MD -Author on March 20, 2021 07:22:36 PM

## 2021-05-14 NOTE — PROGRESS NOTES
Progress Note      Patient Name: Christal Mcduffie   Patient ID: [id number]   Sex: Female   YOB: 1980    Primary Care Provider: Liz Milligan MD   Referring Provider: Mari Leong DO    Visit Date: December 31, 2020    Provider: Lizette Corona PA-C   [facility]   Location Address: [address] [place] [address]  [id number]   Location Phone: [phone]          Chief Complaint  · sore throat      History Of Present Illness  Christal Mcduffie is a 40 year old /White female who presents for evaluation and treatment of:      headahces, bodyaches, upset stomach, diarrhea since getting COVID vaccine on 12/26/2020.  kids had a sore throat and fatigue a week prior to her developing symptoms.  She admits to a mild sore throat prior to receiving vaccine that day. Sore throat and body aches are worst symptom at this time and has worsened over the past day.  She is having a hard time swallowing anything because of the soreness in her throat.       Past Medical History  Disease Name Date Onset Notes   Acute maxillary sinusitis, recurrence not specified 03/28/2016 will try amoxil + clinda   Allergic Rhinitis --  --    Anxiety --  --    Chronic rhinosinusitis --  --    Depression --  --    Facial aging --  --    GERD (gastroesophageal reflux disease) --  --    Irritable bowel syndrome --  --    Keloid --  --    Migraine Headaches --  --    Myofascial muscle pain --  --    Night sweats --  --    Reflux --  --          Past Surgical History  Procedure Name Date Notes   Breast augmentation surgery, bilateral --  --    C-sections 2009, 2013 2009 twins   Cesarian Section --  --    Colonoscopy 2019 --    D and C 2008, 2003 --    EGD 2019 --    Hysterectomy --  --    Joint Surgery --  --    Knee surgery [id number] Right knee 6 surgeries   Metal implants --  --          Medication List  Name Date Started Instructions   buspirone 10 mg oral tablet 11/13/2020 take 1 tablet  (10 mg) by oral route 2 times per day   Calcium 500 500 mg calcium (1,250 mg) oral tablet  take 1 tablet by oral route daily   Pepcid 40 mg oral tablet  take 1 tablet (40 mg) by oral route once daily at bedtime   Prevacid oral  --    sertraline 50 mg oral tablet 08/21/2020 TAKE 1 TABLET BY MOUTH EVERY DAY         Allergy List  Allergen Name Date Reaction Notes   NO KNOWN DRUG ALLERGIES --  --  --    NO KNOWN DRUG ALLERGIES --  --  --          Family Medical History  Disease Name Relative/Age Notes   Heart Disease Grandfather (paternal)/   --    Hypertension Mother/   --    Colon Cancer Grandfather (paternal)/   --    Osteoporosis Mother/   Mother         Social History  Finding Status Start/Stop Quantity Notes   Alcohol Current some day --/-- 3 drinks a week --    Alcohol Use Current some day --/-- --  drinks weekly, less than 1 drink per day, has been drinking for 11-20 years   Exercises regularly --  --/-- --  --    Homemaker. --  --/-- --  --    lives with children --  --/-- --  --    lives with spouse --  --/-- --  --     --  --/-- --  --    . --  --/-- --  --    Recreational Drug Use Never --/-- --  no   Tobacco Never --/-- --  never smoker   Working --  --/-- --  --          Immunizations  NameDate Admin Mfg Trade Name Lot Number Route Inj VIS Given VIS Publication   Ernwsupqc00/15/2019 SK Fluarix, quadrivalent, preservative free  NE NE     Comments:          Review of Systems  · Constitutional  o Admits  o : fatigue  o Denies  o : fever, weight loss, weight gain  · HENT  o Admits  o : sore throat  · Cardiovascular  o Denies  o : lower extremity edema, claudication, chest pressure, palpitations  · Respiratory  o Denies  o : shortness of breath, wheezing, frequent cough, hemoptysis, dyspnea on exertion  · Gastrointestinal  o Denies  o : nausea, vomiting, diarrhea, constipation, abdominal pain      Vitals  Date Time BP Position Site L\R Cuff Size HR RR TEMP (F) WT  HT  BMI kg/m2 BSA m2 O2 Sat FR  "L/min FiO2 HC       02/28/2020 09:02 /72 Sitting    80 - R 18 98.1 124lbs 6oz 5'  8\" 18.91 1.65 100 %  21%    11/13/2020 09:21 /60 Sitting    82 - R  97.5 126lbs 6oz 5'  8\" 19.22 1.66 100 %  21%    12/31/2020 09:31 /80 Sitting    83 - R 16 98.9 129lbs 8oz 5'  8\" 19.69 1.68 99 %            Physical Examination  · Constitutional  o Appearance  o : no acute distress, well-nourished  · Head and Face  o Head  o :   § Inspection  § : atraumatic, normocephalic  · Eyes  o Eyes  o : extraocular movements intact, no scleral icterus, no conjunctival injection  · Ears, Nose, Mouth and Throat  o Ears  o :   § External Ears  § : normal  § Otoscopic Examination  § : tympanic membrane appearance within normal limits bilaterally  o Nose  o :   § Intranasal Exam  § : nares patent  o Oral Cavity  o :   § Oral Mucosa  § : moist mucous membranes  o Throat  o :   § Oropharynx  § : erythematous, no inflammation or lesions present, tonsils within normal limits  · Respiratory  o Respiratory Effort  o : breathing comfortably, symmetric chest rise  o Auscultation of Lungs  o : clear to asculatation bilaterally, no wheezes, rales, or rhonchii  · Cardiovascular  o Heart  o :   § Auscultation of Heart  § : regular rate and rhythm, no murmurs, rubs, or gallops  o Peripheral Vascular System  o :   § Extremities  § : no edema  · Lymphatic  o Neck  o : no lymphadenopathy present  · Skin and Subcutaneous Tissue  o General Inspection  o : no lesions present, no areas of discoloration, skin turgor normal  · Neurologic  o Mental Status Examination  o :   § Orientation  § : grossly oriented to person, place and time  o Gait and Station  o :   § Gait Screening  § : normal gait  · Psychiatric  o General  o : normal mood and affect          Results  · In-Office Procedures  o Lab procedure  § IOP - Rapid Strep (26534)   § Beta Strep Gp A Culture: Negative   § Internal Control Verified?: Yes   § IOP - Influenza A/B Test (58877)   § Influenza " A: Negative   § Influenza B: Negative   § Internal Control Verified?: Yes       Assessment  · Fatigue     780.79/R53.83  · Headache     784.0/R51  · Sore throat     462/J02.9  Strep negative in office, will send for culture. Will send in a short course of steroids to help with inflammation in throat while we wait for culture results. Symptoms could be viral etiology. Even though patient got covid vaccine, I have some concern that she may have gotten the virus prior to the vaccine, especially since her kids were sick a week prior. Covid swab done in office. Continue conservative treatment at this time. Watch closely for worsening or persistent fever, difficulty breathing, shortness of breath, altered mental status or other worrisome symptoms. Go to ER if these develop. Call for any questions or concerns.      Plan  · Orders  o ACO-14: Influenza immunization administered or previously received [facility] () - - 12/31/2020  o ACO-39: Current medications updated and reviewed (1159F, ) - - 12/31/2020  o [facility] NCOV2 (send-out) (61454) - 462/J02.9, 784.0/R51 - 12/31/2020  o Throat culture and sensitivity (25927) - 462/J02.9, 784.0/R51 - 12/31/2020  · Medications  o prednisone 20 mg oral tablet   SIG: take 2 tablets (40 mg) by oral route once daily for 3 days   DISP: (6) Tablet with 0 refills  Prescribed on 12/31/2020     o Medications have been Reconciled  o Transition of Care or Provider Policy  · Instructions  o Take all medications as prescribed/directed.  o Rest. Increase Fluids.  o Patient was educated/instructed on their diagnosis, treatment and medications prior to discharge from the clinic today.  o Patient instructed to seek medical attention urgently for new or worsening symptoms.  · Disposition  o Call or Return if symptoms worsen or persist.  o follow up as needed  o Medications sent electronically to pharmacy  o Discussed with Dr. Hughes            Electronically Signed by: Lizette Corona PA-C  -Author on December 31, 2020 12:22:07 PM

## 2021-05-14 NOTE — PROGRESS NOTES
"   Progress Note      Patient Name: Christal Mcduffie   Patient ID: [id number]   Sex: Female   YOB: 1980    Primary Care Provider: Liz Milligan MD   Referring Provider: Mari Leong DO    Visit Date: March 26, 2021    Provider: Liz Milligan MD   Location: [facility]   Location Address: [address] [place] [address]  [id number]   Location Phone: [phone]          Chief Complaint  · \"following up on Wellbutrin\"  · \"discuss stomach issues\"      History Of Present Illness  Christal Mcduffie is a 41 year old /White female who presents for evaluation and treatment of:      chronic issues    vaginal discharge-  states that surgical procedure for granulation tissue went well  no further discharge currently  I have reviewed these note from her gynecologist    anxiety-  she likes the Wellbutrin, feels like it doesn't help her feel as tense  still uses Zoloft  no chest pain, no trouble breathing  no SI  feels calmer in typical situations    botox for clenching and grinding of her jaw  she did it at [facility]    having pain in her left upper quadrant, more in epigastric region  it is a dull/burning pain that is constantly there         Past Medical History  Disease Name Date Onset Notes   Acute maxillary sinusitis, recurrence not specified 03/28/2016 will try amoxil + clinda   Allergic Rhinitis --  --    Anxiety --  --    Chronic rhinosinusitis --  --    Depression --  --    Facial aging --  --    GERD (gastroesophageal reflux disease) --  --    Irritable bowel syndrome --  --    Keloid --  --    Migraine Headaches --  --    Myofascial muscle pain --  --    Night sweats --  --    Reflux --  --          Past Surgical History  Procedure Name Date Notes   Breast augmentation surgery, bilateral --  --    C-sections 2009, 2013 2009 twins   Cesarian Section --  --    Colonoscopy 2019 --    D and C 2008, 2003 --    EGD 2019 --    Hysterectomy --  --    Joint Surgery --  --  " "  Knee surgery [id number] Right knee 6 surgeries   Metal implants --  --          Medication List  Name Date Started Instructions   buspirone 10 mg oral tablet 11/13/2020 take 1 tablet (10 mg) by oral route 2 times per day   Calcium 500 500 mg calcium (1,250 mg) oral tablet  take 1 tablet by oral route daily   magnesium 250 mg oral tablet  take 1 tablet by oral route daily   Pepcid 40 mg oral tablet  take 1 tablet (40 mg) by oral route once daily at bedtime   Prevacid oral  --    sertraline 50 mg oral tablet 01/08/2021 TAKE 1 TABLET BY MOUTH EVERY DAY   Wellbutrin  mg oral tablet extended release 24 hr 02/05/2021 take 1 tablet (150 mg) by oral route once daily         Allergy List  Allergen Name Date Reaction Notes   NO KNOWN DRUG ALLERGIES --  --  --        Allergies Reconciled  Family Medical History  Disease Name Relative/Age Notes   Heart Disease Grandfather (paternal)/   --    Hypertension Mother/   --    Colon Cancer Grandfather (paternal)/   --    Osteoporosis Mother/   Mother         Social History  Finding Status Start/Stop Quantity Notes   Alcohol Current some day --/-- 3 drinks a week --    Alcohol Use Current some day --/-- --  drinks weekly, less than 1 drink per day, has been drinking for 11-20 years   Exercises regularly --  --/-- --  --    Homemaker. --  --/-- --  --    lives with children --  --/-- --  --    lives with spouse --  --/-- --  --     --  --/-- --  --    . --  --/-- --  --    Recreational Drug Use Never --/-- --  no   Tobacco Never --/-- --  never smoker   Working --  --/-- --  --          Immunizations  NameDate Admin Mfg Trade Name Lot Number Route Inj VIS Given VIS Publication   Ksoashiej22/15/2019 SKB Fluarix, quadrivalent, preservative free  NE NE     Comments:          Vitals  Date Time BP Position Site L\R Cuff Size HR RR TEMP (F) WT  HT  BMI kg/m2 BSA m2 O2 Sat FR L/min FiO2 HC       12/31/2020 09:31 /80 Sitting    83 - R 16 98.9 129lbs 8oz 5'  8\" 19.69 " "1.68 99 %      01/08/2021 08:49 /86 Sitting    87 - R  97.6 126lbs 8oz 5'  8\" 19.23 1.66 97 %  21%    02/05/2021 08:30 /62 Sitting    81 - R 16 96.8 131lbs 2oz 5'  8\" 19.94 1.69 98 %  21%    03/26/2021 09:08 /82 Sitting    70 - R  97.8 130lbs 6oz 5'  8\" 19.82 1.68 100 %  21%          Physical Examination  · Constitutional  o Appearance  o : no acute distress, well-nourished  · Head and Face  o Head  o :   § Inspection  § : atraumatic, normocephalic  · Eyes  o Eyes  o : extraocular movements intact, no scleral icterus, no conjunctival injection  · Respiratory  o Respiratory Effort  o : breathing comfortably, symmetric chest rise  o Auscultation of Lungs  o : clear to asculatation bilaterally, no wheezes, rales, or rhonchii  · Cardiovascular  o Heart  o :   § Auscultation of Heart  § : regular rate and rhythm, no murmurs, rubs, or gallops  o Peripheral Vascular System  o :   § Extremities  § : no edema  · Gastrointestinal  o Abdominal Examination  o :   § Abdomen  § : slight tenderness to palpiation in epigastric region, increased bowel sounds  · Neurologic  o Mental Status Examination  o :   § Orientation  § : grossly oriented to person, place and time  o Gait and Station  o :   § Gait Screening  § : normal gait  · Psychiatric  o General  o : normal mood and affect          Assessment  · Anxiety     300.02/F41.1  doing great cont current meds  · GERD (gastroesophageal reflux disease)     530.81/K21.9  will start protonix for 3 months  · Screening for depression     V79.0/Z13.89  · Visit for screening mammogram     V76.12/Z12.31  · Vaginal discharge     623.5/N89.8  doing great post op  cont to montior    Problems Reconciled  Plan  · Orders  o ACO-18: Negative screen for clinical depression using a standardized tool () - V79.0/Z13.89 - 03/26/2021  o Screening Mammography; Bilateral 3D (29988, 64976, ) - V76.12/Z12.31 - 03/26/2021  o ACO-39: Current medications updated and reviewed (8167Y, " ) - - 03/26/2021  · Medications  o Protonix 40 mg oral tablet,delayed release (DR/EC)   SIG: take 1 tablet (40 mg) by oral route once daily   DISP: (90) Tablet with 0 refills  Prescribed on 03/26/2021     o Medications have been Reconciled  o Transition of Care or Provider Policy  · Instructions  o Depression Screen completed and scanned into the EMR under the designated folder within the patient's documents.  o Today's PHQ-9 result is _0__  o Patient was educated/instructed on their diagnosis, treatment and medications prior to discharge from the clinic today.  · Disposition  o 3 Month Follow Up            Electronically Signed by: Liz Milligan MD -Author on March 26, 2021 10:53:57 AM

## 2021-05-15 VITALS
SYSTOLIC BLOOD PRESSURE: 118 MMHG | TEMPERATURE: 98.1 F | WEIGHT: 124.37 LBS | RESPIRATION RATE: 18 BRPM | OXYGEN SATURATION: 100 % | HEIGHT: 68 IN | BODY MASS INDEX: 18.85 KG/M2 | DIASTOLIC BLOOD PRESSURE: 72 MMHG | HEART RATE: 80 BPM

## 2021-05-15 VITALS
HEIGHT: 68 IN | HEART RATE: 88 BPM | WEIGHT: 124.37 LBS | OXYGEN SATURATION: 100 % | DIASTOLIC BLOOD PRESSURE: 64 MMHG | RESPIRATION RATE: 16 BRPM | SYSTOLIC BLOOD PRESSURE: 128 MMHG | BODY MASS INDEX: 18.85 KG/M2 | TEMPERATURE: 97.9 F

## 2021-05-15 VITALS
HEIGHT: 68 IN | WEIGHT: 125.37 LBS | DIASTOLIC BLOOD PRESSURE: 88 MMHG | SYSTOLIC BLOOD PRESSURE: 118 MMHG | TEMPERATURE: 96.9 F | OXYGEN SATURATION: 100 % | BODY MASS INDEX: 19 KG/M2 | HEART RATE: 72 BPM

## 2021-05-15 VITALS
SYSTOLIC BLOOD PRESSURE: 116 MMHG | BODY MASS INDEX: 18.87 KG/M2 | DIASTOLIC BLOOD PRESSURE: 75 MMHG | WEIGHT: 124.5 LBS | HEIGHT: 68 IN

## 2021-05-15 VITALS
WEIGHT: 128 LBS | SYSTOLIC BLOOD PRESSURE: 112 MMHG | DIASTOLIC BLOOD PRESSURE: 76 MMHG | OXYGEN SATURATION: 100 % | HEART RATE: 82 BPM | BODY MASS INDEX: 19.4 KG/M2 | HEIGHT: 68 IN | TEMPERATURE: 97.6 F | RESPIRATION RATE: 15 BRPM

## 2021-05-15 VITALS
HEIGHT: 68 IN | WEIGHT: 128.37 LBS | SYSTOLIC BLOOD PRESSURE: 127 MMHG | DIASTOLIC BLOOD PRESSURE: 69 MMHG | BODY MASS INDEX: 19.46 KG/M2

## 2021-05-16 VITALS
TEMPERATURE: 97.9 F | HEIGHT: 68 IN | WEIGHT: 127 LBS | RESPIRATION RATE: 16 BRPM | SYSTOLIC BLOOD PRESSURE: 103 MMHG | OXYGEN SATURATION: 100 % | HEART RATE: 75 BPM | BODY MASS INDEX: 19.25 KG/M2 | DIASTOLIC BLOOD PRESSURE: 64 MMHG

## 2021-05-16 VITALS
DIASTOLIC BLOOD PRESSURE: 71 MMHG | WEIGHT: 120 LBS | BODY MASS INDEX: 18.19 KG/M2 | SYSTOLIC BLOOD PRESSURE: 112 MMHG | OXYGEN SATURATION: 98 % | HEART RATE: 80 BPM | HEIGHT: 68 IN

## 2021-05-16 VITALS
OXYGEN SATURATION: 98 % | SYSTOLIC BLOOD PRESSURE: 126 MMHG | WEIGHT: 128.25 LBS | HEART RATE: 81 BPM | HEIGHT: 68 IN | DIASTOLIC BLOOD PRESSURE: 76 MMHG | TEMPERATURE: 98.7 F | BODY MASS INDEX: 19.44 KG/M2 | RESPIRATION RATE: 16 BRPM

## 2021-05-16 VITALS
SYSTOLIC BLOOD PRESSURE: 110 MMHG | DIASTOLIC BLOOD PRESSURE: 72 MMHG | TEMPERATURE: 97.6 F | BODY MASS INDEX: 19.44 KG/M2 | HEART RATE: 77 BPM | WEIGHT: 128.25 LBS | OXYGEN SATURATION: 100 % | HEIGHT: 68 IN | RESPIRATION RATE: 16 BRPM

## 2021-05-16 VITALS
SYSTOLIC BLOOD PRESSURE: 92 MMHG | RESPIRATION RATE: 16 BRPM | BODY MASS INDEX: 18.72 KG/M2 | HEART RATE: 68 BPM | DIASTOLIC BLOOD PRESSURE: 58 MMHG | TEMPERATURE: 97.7 F | HEIGHT: 68 IN | WEIGHT: 123.5 LBS | OXYGEN SATURATION: 98 %

## 2021-08-18 ENCOUNTER — TELEMEDICINE (OUTPATIENT)
Dept: INTERNAL MEDICINE | Facility: CLINIC | Age: 41
End: 2021-08-18

## 2021-08-18 DIAGNOSIS — K21.9 GASTROESOPHAGEAL REFLUX DISEASE, UNSPECIFIED WHETHER ESOPHAGITIS PRESENT: Primary | ICD-10-CM

## 2021-08-18 DIAGNOSIS — Z13.220 SCREENING, LIPID: ICD-10-CM

## 2021-08-18 DIAGNOSIS — F41.9 ANXIETY: ICD-10-CM

## 2021-08-18 DIAGNOSIS — J30.9 ALLERGIC RHINITIS, UNSPECIFIED SEASONALITY, UNSPECIFIED TRIGGER: ICD-10-CM

## 2021-08-18 PROBLEM — G43.909 MIGRAINE: Status: ACTIVE | Noted: 2021-08-18

## 2021-08-18 PROCEDURE — 99214 OFFICE O/P EST MOD 30 MIN: CPT | Performed by: INTERNAL MEDICINE

## 2021-08-18 RX ORDER — BUPROPION HYDROCHLORIDE 150 MG/1
150 TABLET ORAL EVERY MORNING
Qty: 90 TABLET | Refills: 1 | Status: SHIPPED | OUTPATIENT
Start: 2021-08-18 | End: 2022-03-04 | Stop reason: SDUPTHER

## 2021-08-18 RX ORDER — FAMOTIDINE 40 MG/1
40 TABLET, FILM COATED ORAL DAILY
COMMUNITY
End: 2022-11-18

## 2021-08-18 RX ORDER — IBUPROFEN 200 MG
CAPSULE ORAL
COMMUNITY
End: 2023-02-24 | Stop reason: SDUPTHER

## 2021-08-18 RX ORDER — DEXLANSOPRAZOLE 60 MG/1
60 CAPSULE, DELAYED RELEASE ORAL DAILY
Qty: 90 CAPSULE | Refills: 0 | Status: SHIPPED | OUTPATIENT
Start: 2021-08-18 | End: 2021-11-16

## 2021-08-18 RX ORDER — BUSPIRONE HYDROCHLORIDE 10 MG/1
10 TABLET ORAL 3 TIMES DAILY
COMMUNITY
End: 2021-08-18 | Stop reason: SDUPTHER

## 2021-08-18 RX ORDER — BUPROPION HYDROCHLORIDE 150 MG/1
TABLET ORAL
COMMUNITY
Start: 2021-05-10 | End: 2021-08-18 | Stop reason: SDUPTHER

## 2021-08-18 RX ORDER — LANSOPRAZOLE 30 MG/1
30 CAPSULE, DELAYED RELEASE ORAL DAILY
COMMUNITY
End: 2022-02-25 | Stop reason: SDDI

## 2021-08-18 RX ORDER — BUSPIRONE HYDROCHLORIDE 10 MG/1
10 TABLET ORAL 3 TIMES DAILY
Qty: 270 TABLET | Refills: 0 | Status: SHIPPED | OUTPATIENT
Start: 2021-08-18 | End: 2022-01-13

## 2021-08-18 RX ORDER — PANTOPRAZOLE SODIUM 40 MG/1
40 TABLET, DELAYED RELEASE ORAL DAILY
COMMUNITY
Start: 2021-07-31 | End: 2021-11-09

## 2021-08-23 ENCOUNTER — DOCUMENTATION (OUTPATIENT)
Dept: INTERNAL MEDICINE | Facility: CLINIC | Age: 41
End: 2021-08-23

## 2021-08-23 ENCOUNTER — PRIOR AUTHORIZATION (OUTPATIENT)
Dept: INTERNAL MEDICINE | Facility: CLINIC | Age: 41
End: 2021-08-23

## 2021-08-23 RX ORDER — HYDROXYZINE HYDROCHLORIDE 25 MG/1
25 TABLET, FILM COATED ORAL
Qty: 30 TABLET | Refills: 2 | Status: SHIPPED | OUTPATIENT
Start: 2021-08-23 | End: 2021-09-22

## 2021-11-09 ENCOUNTER — OFFICE VISIT (OUTPATIENT)
Dept: INTERNAL MEDICINE | Facility: CLINIC | Age: 41
End: 2021-11-09

## 2021-11-09 VITALS
HEIGHT: 68 IN | OXYGEN SATURATION: 98 % | DIASTOLIC BLOOD PRESSURE: 72 MMHG | HEART RATE: 83 BPM | WEIGHT: 131.38 LBS | TEMPERATURE: 97.6 F | BODY MASS INDEX: 19.91 KG/M2 | SYSTOLIC BLOOD PRESSURE: 114 MMHG

## 2021-11-09 DIAGNOSIS — L23.89 ALLERGIC CONTACT DERMATITIS DUE TO OTHER AGENTS: Primary | ICD-10-CM

## 2021-11-09 PROCEDURE — 99213 OFFICE O/P EST LOW 20 MIN: CPT | Performed by: NURSE PRACTITIONER

## 2021-11-09 RX ORDER — METHYLPREDNISOLONE 4 MG/1
TABLET ORAL
Qty: 1 EACH | Refills: 0 | Status: SHIPPED | OUTPATIENT
Start: 2021-11-09 | End: 2021-12-15

## 2021-11-09 NOTE — PROGRESS NOTES
"Chief Complaint  Rash (on face and neck, started yesterday morning, has tried OTC medications with no success, extremely itchy)    Subjective          Christal Mcduffie presents to [facility]  History of Present Illness  She recently changed dryer sheets. Otherwise she has not changed moisturizers or other topical products. She denies previous issue with this. Onset yesterday. Worsening thought the day today. Severe itching. Denies swelling or airway, itchy throat, soa or wheezing.  She has tried benadryl, famotidine and xyzal  Objective   Vital Signs:   /72   Pulse 83   Temp 97.6 °F (36.4 °C) (Temporal)   Ht 172.7 cm (68\")   Wt 59.6 kg (131 lb 6 oz)   SpO2 98%   BMI 19.98 kg/m²     Physical Exam  Vitals and nursing note reviewed.   Constitutional:       General: She is not in acute distress.     Appearance: Normal appearance.   HENT:      Head: Normocephalic and atraumatic.      Right Ear: External ear normal.      Left Ear: External ear normal.      Nose: Nose normal.      Mouth/Throat:      Mouth: Mucous membranes are moist.   Eyes:      Conjunctiva/sclera: Conjunctivae normal.   Cardiovascular:      Rate and Rhythm: Normal rate and regular rhythm.      Pulses: Normal pulses.      Heart sounds: Normal heart sounds. No murmur heard.  No friction rub. No gallop.    Pulmonary:      Effort: Pulmonary effort is normal. No respiratory distress.      Breath sounds: No wheezing, rhonchi or rales.   Musculoskeletal:      Cervical back: Neck supple.      Right lower leg: No edema.      Left lower leg: No edema.   Skin:     General: Skin is warm and dry.      Findings: Rash (maculopapular rash of cheeks, neck and chest, erytnematous with indurated patches on neck) present.   Neurological:      General: No focal deficit present.      Mental Status: She is alert and oriented to person, place, and time.   Psychiatric:         Mood and Affect: Mood normal.         " Behavior: Behavior normal.        Result Review :          Procedures      Assessment and Plan    Diagnoses and all orders for this visit:    1. Allergic contact dermatitis due to other agents (Primary)  Assessment & Plan:  We will try Medrol Dosepak.  She will continue with famotidine and Xyzal daily for the next 5 days.  Discussed further testing if rash returns or does not resolve.      Other orders  -     methylPREDNISolone (MEDROL) 4 MG dose pack; Take as directed on package instructions.  Dispense: 1 each; Refill: 0            Follow Up   No follow-ups on file.  Patient was given instructions and counseling regarding her condition or for health maintenance advice. Please see specific information pulled into the AVS if appropriate.

## 2021-11-09 NOTE — ASSESSMENT & PLAN NOTE
We will try Medrol Dosepak.  She will continue with famotidine and Xyzal daily for the next 5 days.  Discussed further testing if rash returns or does not resolve.

## 2021-11-29 RX ORDER — DEXLANSOPRAZOLE 60 MG/1
CAPSULE, DELAYED RELEASE ORAL
Qty: 90 CAPSULE | Refills: 0 | OUTPATIENT
Start: 2021-11-29

## 2021-12-15 RX ORDER — PREDNISONE 20 MG/1
TABLET ORAL
Qty: 19 TABLET | Refills: 0 | Status: SHIPPED | OUTPATIENT
Start: 2021-12-15 | End: 2022-02-25 | Stop reason: SDDI

## 2021-12-22 ENCOUNTER — TELEPHONE (OUTPATIENT)
Dept: INTERNAL MEDICINE | Facility: CLINIC | Age: 41
End: 2021-12-22

## 2021-12-22 NOTE — TELEPHONE ENCOUNTER
Caller: Christal Mcduffie    Relationship: Self    Best call back number: [phone]    Requested Prescriptions: sertraline (ZOLOFT) 50 MG tablet  Requested Prescriptions      No prescriptions requested or ordered in this encounter        Pharmacy where request should be sent:    [facility] [place] [address] - [phone]  - [phone] FX  [phone]      Additional details provided by patient: NEEDS ASAP    Does the patient have less than a 3 day supply:  [x] Yes  [] No    Maddi Mustafa Rep   12/22/21 13:30 EST

## 2022-01-13 RX ORDER — BUSPIRONE HYDROCHLORIDE 10 MG/1
TABLET ORAL
Qty: 180 TABLET | Refills: 1 | Status: SHIPPED | OUTPATIENT
Start: 2022-01-13 | End: 2022-10-18 | Stop reason: SDUPTHER

## 2022-01-18 RX ORDER — BUSPIRONE HYDROCHLORIDE 10 MG/1
TABLET ORAL
Qty: 270 TABLET | Refills: 0 | OUTPATIENT
Start: 2022-01-18

## 2022-02-25 ENCOUNTER — OFFICE VISIT (OUTPATIENT)
Dept: INTERNAL MEDICINE | Facility: CLINIC | Age: 42
End: 2022-02-25

## 2022-02-25 VITALS
DIASTOLIC BLOOD PRESSURE: 72 MMHG | HEIGHT: 68 IN | RESPIRATION RATE: 18 BRPM | BODY MASS INDEX: 19.7 KG/M2 | SYSTOLIC BLOOD PRESSURE: 106 MMHG | WEIGHT: 130 LBS | HEART RATE: 92 BPM | OXYGEN SATURATION: 99 % | TEMPERATURE: 98.3 F

## 2022-02-25 DIAGNOSIS — Z12.31 ENCOUNTER FOR SCREENING MAMMOGRAM FOR BREAST CANCER: ICD-10-CM

## 2022-02-25 DIAGNOSIS — Z63.5 DIVORCED: ICD-10-CM

## 2022-02-25 DIAGNOSIS — K21.9 GASTROESOPHAGEAL REFLUX DISEASE, UNSPECIFIED WHETHER ESOPHAGITIS PRESENT: ICD-10-CM

## 2022-02-25 DIAGNOSIS — Z11.59 NEED FOR HEPATITIS C SCREENING TEST: ICD-10-CM

## 2022-02-25 DIAGNOSIS — Z13.220 SCREENING, LIPID: ICD-10-CM

## 2022-02-25 DIAGNOSIS — F41.9 ANXIETY: Primary | ICD-10-CM

## 2022-02-25 DIAGNOSIS — F41.9 ANXIETY: ICD-10-CM

## 2022-02-25 PROBLEM — J30.9 ALLERGIC RHINITIS: Status: RESOLVED | Noted: 2021-08-18 | Resolved: 2022-02-25

## 2022-02-25 LAB
ALBUMIN SERPL-MCNC: 4.6 G/DL (ref 3.5–5.2)
ALBUMIN/GLOB SERPL: 1.6 G/DL
ALP SERPL-CCNC: 51 U/L (ref 39–117)
ALT SERPL W P-5'-P-CCNC: 24 U/L (ref 1–33)
ANION GAP SERPL CALCULATED.3IONS-SCNC: 11 MMOL/L (ref 5–15)
AST SERPL-CCNC: 24 U/L (ref 1–32)
BASOPHILS # BLD AUTO: 0.04 10*3/MM3 (ref 0–0.2)
BASOPHILS NFR BLD AUTO: 1 % (ref 0–1.5)
BILIRUB SERPL-MCNC: 0.3 MG/DL (ref 0–1.2)
BUN SERPL-MCNC: 10 MG/DL (ref 6–20)
BUN/CREAT SERPL: 9.1 (ref 7–25)
CALCIUM SPEC-SCNC: 10.6 MG/DL (ref 8.6–10.5)
CHLORIDE SERPL-SCNC: 103 MMOL/L (ref 98–107)
CHOLEST SERPL-MCNC: 153 MG/DL (ref 0–200)
CO2 SERPL-SCNC: 26 MMOL/L (ref 22–29)
CREAT SERPL-MCNC: 1.1 MG/DL (ref 0.57–1)
DEPRECATED RDW RBC AUTO: 39 FL (ref 37–54)
EOSINOPHIL # BLD AUTO: 0.17 10*3/MM3 (ref 0–0.4)
EOSINOPHIL NFR BLD AUTO: 4.2 % (ref 0.3–6.2)
ERYTHROCYTE [DISTWIDTH] IN BLOOD BY AUTOMATED COUNT: 12 % (ref 12.3–15.4)
GFR SERPL CREATININE-BSD FRML MDRD: 55 ML/MIN/1.73
GLOBULIN UR ELPH-MCNC: 2.8 GM/DL
GLUCOSE SERPL-MCNC: 106 MG/DL (ref 65–99)
HCT VFR BLD AUTO: 39.6 % (ref 34–46.6)
HCV AB SER DONR QL: NORMAL
HDLC SERPL-MCNC: 56 MG/DL (ref 40–60)
HGB BLD-MCNC: 13.6 G/DL (ref 12–15.9)
IMM GRANULOCYTES # BLD AUTO: 0.01 10*3/MM3 (ref 0–0.05)
IMM GRANULOCYTES NFR BLD AUTO: 0.2 % (ref 0–0.5)
LDLC SERPL CALC-MCNC: 85 MG/DL (ref 0–100)
LDLC/HDLC SERPL: 1.52 {RATIO}
LYMPHOCYTES # BLD AUTO: 1.22 10*3/MM3 (ref 0.7–3.1)
LYMPHOCYTES NFR BLD AUTO: 29.9 % (ref 19.6–45.3)
MCH RBC QN AUTO: 31.1 PG (ref 26.6–33)
MCHC RBC AUTO-ENTMCNC: 34.3 G/DL (ref 31.5–35.7)
MCV RBC AUTO: 90.6 FL (ref 79–97)
MONOCYTES # BLD AUTO: 0.41 10*3/MM3 (ref 0.1–0.9)
MONOCYTES NFR BLD AUTO: 10 % (ref 5–12)
NEUTROPHILS NFR BLD AUTO: 2.23 10*3/MM3 (ref 1.7–7)
NEUTROPHILS NFR BLD AUTO: 54.7 % (ref 42.7–76)
NRBC BLD AUTO-RTO: 0 /100 WBC (ref 0–0.2)
PLATELET # BLD AUTO: 257 10*3/MM3 (ref 140–450)
PMV BLD AUTO: 10.8 FL (ref 6–12)
POTASSIUM SERPL-SCNC: 5 MMOL/L (ref 3.5–5.2)
PROT SERPL-MCNC: 7.4 G/DL (ref 6–8.5)
RBC # BLD AUTO: 4.37 10*6/MM3 (ref 3.77–5.28)
SODIUM SERPL-SCNC: 140 MMOL/L (ref 136–145)
T4 FREE SERPL-MCNC: 1.21 NG/DL (ref 0.93–1.7)
TRIGL SERPL-MCNC: 59 MG/DL (ref 0–150)
TSH SERPL DL<=0.05 MIU/L-ACNC: 1.37 UIU/ML (ref 0.27–4.2)
VLDLC SERPL-MCNC: 12 MG/DL (ref 5–40)
WBC NRBC COR # BLD: 4.08 10*3/MM3 (ref 3.4–10.8)

## 2022-02-25 PROCEDURE — 99214 OFFICE O/P EST MOD 30 MIN: CPT | Performed by: INTERNAL MEDICINE

## 2022-02-25 PROCEDURE — 80053 COMPREHEN METABOLIC PANEL: CPT | Performed by: INTERNAL MEDICINE

## 2022-02-25 PROCEDURE — 86803 HEPATITIS C AB TEST: CPT | Performed by: INTERNAL MEDICINE

## 2022-02-25 PROCEDURE — 84439 ASSAY OF FREE THYROXINE: CPT | Performed by: INTERNAL MEDICINE

## 2022-02-25 PROCEDURE — 80061 LIPID PANEL: CPT | Performed by: INTERNAL MEDICINE

## 2022-02-25 PROCEDURE — 36415 COLL VENOUS BLD VENIPUNCTURE: CPT | Performed by: INTERNAL MEDICINE

## 2022-02-25 PROCEDURE — 85025 COMPLETE CBC W/AUTO DIFF WBC: CPT | Performed by: INTERNAL MEDICINE

## 2022-02-25 PROCEDURE — 84443 ASSAY THYROID STIM HORMONE: CPT | Performed by: INTERNAL MEDICINE

## 2022-02-25 RX ORDER — MOMETASONE FUROATE 1 MG/G
1 CREAM TOPICAL AS NEEDED
COMMUNITY
Start: 2022-02-10 | End: 2022-11-18

## 2022-02-25 RX ORDER — LEVOCETIRIZINE DIHYDROCHLORIDE 5 MG/1
5 TABLET, FILM COATED ORAL DAILY
COMMUNITY
Start: 2022-01-03 | End: 2023-02-24 | Stop reason: SDUPTHER

## 2022-02-25 SDOH — SOCIAL STABILITY - SOCIAL INSECURITY: DISRUPTION OF FAMILY BY SEPARATION AND DIVORCE: Z63.5

## 2022-02-25 NOTE — PROGRESS NOTES
"Chief Complaint  Follow-up (patient said you wanted to see her back in 6 months) for anxiety    Subjective          Christal Mcduffie presents to [facility]  History of Present Illness     Recently going through a divorce  States that she is feeling much better  Her anxiety is much better controlled  States that she is increasing her work schedule to go back to full time  She has been able to decrease her buspar usage to only a few times a week  Her kids are in counseling  She feels like co-parenting is going well    States that stomach is doing much better recently  She weaned down to pepcid once or twice a day    States that she is drinking less now as well    Objective   Vital Signs:   /72 (BP Location: Left arm, Patient Position: Sitting)   Pulse 92   Temp 98.3 °F (36.8 °C) (Oral)   Resp 18   Ht 172.7 cm (67.99\")   Wt 59 kg (130 lb)   SpO2 99%   BMI 19.77 kg/m²     Physical Exam  Vitals reviewed.   Constitutional:       Appearance: Normal appearance. She is well-developed.   HENT:      Head: Normocephalic and atraumatic.      Right Ear: External ear normal.      Left Ear: External ear normal.   Eyes:      Conjunctiva/sclera: Conjunctivae normal.      Pupils: Pupils are equal, round, and reactive to light.   Cardiovascular:      Rate and Rhythm: Normal rate and regular rhythm.      Heart sounds: No murmur heard.  No friction rub. No gallop.    Pulmonary:      Effort: Pulmonary effort is normal.      Breath sounds: Normal breath sounds. No wheezing or rhonchi.   Skin:     General: Skin is warm and dry.   Neurological:      Mental Status: She is alert and oriented to person, place, and time.      Cranial Nerves: No cranial nerve deficit.   Psychiatric:         Mood and Affect: Affect normal.         Behavior: Behavior normal.         Thought Content: Thought content normal.        Result Review :       Common labs    Common Labsle 2/25/22 2/25/22 " 2/25/22    1004 1004 1004   Glucose  106 (A)    BUN  10    Creatinine  1.10 (A)    eGFR Non African Am  55 (A)    Sodium  140    Potassium  5.0    Chloride  103    Calcium  10.6 (A)    Albumin  4.60    Total Bilirubin  0.3    Alkaline Phosphatase  51    AST (SGOT)  24    ALT (SGPT)  24    WBC 4.08     Hemoglobin 13.6     Hematocrit 39.6     Platelets 257     Total Cholesterol   153   Triglycerides   59   HDL Cholesterol   56   LDL Cholesterol    85   (A) Abnormal value              Results for orders placed or performed in visit on 02/25/22   Comprehensive metabolic panel    Specimen: Arm, Left; Blood   Result Value Ref Range    Glucose 106 (H) 65 - 99 mg/dL    BUN 10 6 - 20 mg/dL    Creatinine 1.10 (H) 0.57 - 1.00 mg/dL    Sodium 140 136 - 145 mmol/L    Potassium 5.0 3.5 - 5.2 mmol/L    Chloride 103 98 - 107 mmol/L    CO2 26.0 22.0 - 29.0 mmol/L    Calcium 10.6 (H) 8.6 - 10.5 mg/dL    Total Protein 7.4 6.0 - 8.5 g/dL    Albumin 4.60 3.50 - 5.20 g/dL    ALT (SGPT) 24 1 - 33 U/L    AST (SGOT) 24 1 - 32 U/L    Alkaline Phosphatase 51 39 - 117 U/L    Total Bilirubin 0.3 0.0 - 1.2 mg/dL    eGFR Non African Amer 55 (L) >60 mL/min/1.73    Globulin 2.8 gm/dL    A/G Ratio 1.6 g/dL    BUN/Creatinine Ratio 9.1 7.0 - 25.0    Anion Gap 11.0 5.0 - 15.0 mmol/L   TSH    Specimen: Arm, Left; Blood   Result Value Ref Range    TSH 1.370 0.270 - 4.200 uIU/mL   T4, free    Specimen: Arm, Left; Blood   Result Value Ref Range    Free T4 1.21 0.93 - 1.70 ng/dL   Lipid panel    Specimen: Arm, Left; Blood   Result Value Ref Range    Total Cholesterol 153 0 - 200 mg/dL    Triglycerides 59 0 - 150 mg/dL    HDL Cholesterol 56 40 - 60 mg/dL    LDL Cholesterol  85 0 - 100 mg/dL    VLDL Cholesterol 12 5 - 40 mg/dL    LDL/HDL Ratio 1.52    Hepatitis C Antibody    Specimen: Arm, Left; Blood   Result Value Ref Range    Hepatitis C Ab Non-Reactive Non-Reactive   CBC Auto Differential    Specimen: Arm, Left; Blood   Result Value Ref Range    WBC 4.08  3.40 - 10.80 10*3/mm3    RBC 4.37 3.77 - 5.28 10*6/mm3    Hemoglobin 13.6 12.0 - 15.9 g/dL    Hematocrit 39.6 34.0 - 46.6 %    MCV 90.6 79.0 - 97.0 fL    MCH 31.1 26.6 - 33.0 pg    MCHC 34.3 31.5 - 35.7 g/dL    RDW 12.0 (L) 12.3 - 15.4 %    RDW-SD 39.0 37.0 - 54.0 fl    MPV 10.8 6.0 - 12.0 fL    Platelets 257 140 - 450 10*3/mm3    Neutrophil % 54.7 42.7 - 76.0 %    Lymphocyte % 29.9 19.6 - 45.3 %    Monocyte % 10.0 5.0 - 12.0 %    Eosinophil % 4.2 0.3 - 6.2 %    Basophil % 1.0 0.0 - 1.5 %    Immature Grans % 0.2 0.0 - 0.5 %    Neutrophils, Absolute 2.23 1.70 - 7.00 10*3/mm3    Lymphocytes, Absolute 1.22 0.70 - 3.10 10*3/mm3    Monocytes, Absolute 0.41 0.10 - 0.90 10*3/mm3    Eosinophils, Absolute 0.17 0.00 - 0.40 10*3/mm3    Basophils, Absolute 0.04 0.00 - 0.20 10*3/mm3    Immature Grans, Absolute 0.01 0.00 - 0.05 10*3/mm3    nRBC 0.0 0.0 - 0.2 /100 WBC            Procedures        Assessment and Plan    Diagnoses and all orders for this visit:    1. Anxiety (Primary)  Comments:  doing really well on meds  Orders:  -     Comprehensive metabolic panel  -     CBC w AUTO Differential  -     TSH  -     T4, free  -     Lipid panel    2. Need for hepatitis C screening test  -     Hepatitis C Antibody; Future  -     Hepatitis C Antibody    3.   Comments:  feels like things are going better    4. Encounter for screening mammogram for breast cancer  -     Cancel: Mammo Screening Bilateral With CAD; Future  -     Mammo Screening Bilateral With CAD; Future    5. Gastroesophageal reflux disease, unspecified whether esophagitis present  Comments:  doing well as she has weaned down on meds  Orders:  -     Comprehensive metabolic panel  -     CBC w AUTO Differential  -     TSH  -     T4, free  -     Lipid panel    6. Anxiety  -     Comprehensive metabolic panel  -     CBC w AUTO Differential  -     TSH  -     T4, free  -     Lipid panel    7. Screening, lipid  -     Lipid panel    will draw labs and adjust as  needed            Follow Up   Return in about 3 months (around 5/25/2022).  Patient was given instructions and counseling regarding her condition or for health maintenance advice. Please see specific information pulled into the AVS if appropriate.

## 2022-03-04 RX ORDER — BUPROPION HYDROCHLORIDE 150 MG/1
150 TABLET ORAL EVERY MORNING
Qty: 90 TABLET | Refills: 1 | Status: SHIPPED | OUTPATIENT
Start: 2022-03-04 | End: 2022-09-27 | Stop reason: SDUPTHER

## 2022-05-06 ENCOUNTER — TELEPHONE (OUTPATIENT)
Dept: INTERNAL MEDICINE | Facility: CLINIC | Age: 42
End: 2022-05-06

## 2022-05-12 ENCOUNTER — HOSPITAL ENCOUNTER (OUTPATIENT)
Dept: MAMMOGRAPHY | Facility: HOSPITAL | Age: 42
Discharge: HOME | End: 2022-05-12
Admitting: INTERNAL MEDICINE

## 2022-05-12 DIAGNOSIS — Z12.31 ENCOUNTER FOR SCREENING MAMMOGRAM FOR BREAST CANCER: ICD-10-CM

## 2022-05-12 PROCEDURE — 77063 BREAST TOMOSYNTHESIS BI: CPT

## 2022-05-12 PROCEDURE — 77067 SCR MAMMO BI INCL CAD: CPT

## 2022-09-27 RX ORDER — BUPROPION HYDROCHLORIDE 150 MG/1
150 TABLET ORAL EVERY MORNING
Qty: 90 TABLET | Refills: 0 | Status: SHIPPED | OUTPATIENT
Start: 2022-09-27 | End: 2022-11-18 | Stop reason: SDUPTHER

## 2022-10-13 ENCOUNTER — DOCUMENTATION (OUTPATIENT)
Dept: OBSTETRICS AND GYNECOLOGY | Facility: CLINIC | Age: 42
End: 2022-10-13

## 2022-10-13 RX ORDER — HYDROCORTISONE ACETATE 25 MG/1
25 SUPPOSITORY RECTAL 2 TIMES DAILY
Qty: 60 SUPPOSITORY | Refills: 2 | Status: SHIPPED | OUTPATIENT
Start: 2022-10-13 | End: 2022-11-12

## 2022-10-20 RX ORDER — BUSPIRONE HYDROCHLORIDE 10 MG/1
10 TABLET ORAL 2 TIMES DAILY
Qty: 180 TABLET | Refills: 1 | OUTPATIENT
Start: 2022-10-20

## 2022-10-20 RX ORDER — BUSPIRONE HYDROCHLORIDE 10 MG/1
10 TABLET ORAL 2 TIMES DAILY
Qty: 180 TABLET | Refills: 1 | Status: SHIPPED | OUTPATIENT
Start: 2022-10-20 | End: 2023-02-24 | Stop reason: SDUPTHER

## 2022-11-18 ENCOUNTER — OFFICE VISIT (OUTPATIENT)
Dept: INTERNAL MEDICINE | Facility: CLINIC | Age: 42
End: 2022-11-18

## 2022-11-18 VITALS
BODY MASS INDEX: 20.07 KG/M2 | HEIGHT: 68 IN | OXYGEN SATURATION: 98 % | TEMPERATURE: 98.3 F | HEART RATE: 101 BPM | SYSTOLIC BLOOD PRESSURE: 120 MMHG | DIASTOLIC BLOOD PRESSURE: 74 MMHG | WEIGHT: 132.4 LBS

## 2022-11-18 DIAGNOSIS — H20.9 UVEITIS: ICD-10-CM

## 2022-11-18 DIAGNOSIS — E55.9 VITAMIN D DEFICIENCY: ICD-10-CM

## 2022-11-18 DIAGNOSIS — R19.7 DIARRHEA, UNSPECIFIED TYPE: ICD-10-CM

## 2022-11-18 DIAGNOSIS — F41.9 ANXIETY: Primary | ICD-10-CM

## 2022-11-18 PROBLEM — N83.209 CYST OF OVARY: Status: ACTIVE | Noted: 2022-11-18

## 2022-11-18 PROBLEM — N94.6 DYSMENORRHEA: Status: ACTIVE | Noted: 2022-11-18

## 2022-11-18 LAB
ALBUMIN SERPL-MCNC: 4.7 G/DL (ref 3.5–5.2)
ALBUMIN/GLOB SERPL: 1.7 G/DL
ALP SERPL-CCNC: 51 U/L (ref 39–117)
ALT SERPL W P-5'-P-CCNC: 12 U/L (ref 1–33)
ANION GAP SERPL CALCULATED.3IONS-SCNC: 8.1 MMOL/L (ref 5–15)
AST SERPL-CCNC: 22 U/L (ref 1–32)
BASOPHILS # BLD AUTO: 0.05 10*3/MM3 (ref 0–0.2)
BASOPHILS NFR BLD AUTO: 0.8 % (ref 0–1.5)
BILIRUB SERPL-MCNC: 0.3 MG/DL (ref 0–1.2)
BUN SERPL-MCNC: 18 MG/DL (ref 6–20)
BUN/CREAT SERPL: 16.5 (ref 7–25)
CALCIUM SPEC-SCNC: 10 MG/DL (ref 8.6–10.5)
CHLORIDE SERPL-SCNC: 101 MMOL/L (ref 98–107)
CHOLEST SERPL-MCNC: 123 MG/DL (ref 0–200)
CO2 SERPL-SCNC: 27.9 MMOL/L (ref 22–29)
CREAT SERPL-MCNC: 1.09 MG/DL (ref 0.57–1)
CRP SERPL-MCNC: <0.3 MG/DL (ref 0–0.5)
DEPRECATED RDW RBC AUTO: 37.2 FL (ref 37–54)
EGFRCR SERPLBLD CKD-EPI 2021: 65.6 ML/MIN/1.73
EOSINOPHIL # BLD AUTO: 0.31 10*3/MM3 (ref 0–0.4)
EOSINOPHIL NFR BLD AUTO: 5 % (ref 0.3–6.2)
ERYTHROCYTE [DISTWIDTH] IN BLOOD BY AUTOMATED COUNT: 11.7 % (ref 12.3–15.4)
ERYTHROCYTE [SEDIMENTATION RATE] IN BLOOD: 2 MM/HR (ref 0–20)
GLOBULIN UR ELPH-MCNC: 2.8 GM/DL
GLUCOSE SERPL-MCNC: 89 MG/DL (ref 65–99)
HCT VFR BLD AUTO: 39.2 % (ref 34–46.6)
HDLC SERPL-MCNC: 46 MG/DL (ref 40–60)
HGB BLD-MCNC: 13.7 G/DL (ref 12–15.9)
IMM GRANULOCYTES # BLD AUTO: 0.01 10*3/MM3 (ref 0–0.05)
IMM GRANULOCYTES NFR BLD AUTO: 0.2 % (ref 0–0.5)
LDLC SERPL CALC-MCNC: 60 MG/DL (ref 0–100)
LDLC/HDLC SERPL: 1.3 {RATIO}
LYMPHOCYTES # BLD AUTO: 1.73 10*3/MM3 (ref 0.7–3.1)
LYMPHOCYTES NFR BLD AUTO: 28.1 % (ref 19.6–45.3)
MCH RBC QN AUTO: 31.3 PG (ref 26.6–33)
MCHC RBC AUTO-ENTMCNC: 34.9 G/DL (ref 31.5–35.7)
MCV RBC AUTO: 89.5 FL (ref 79–97)
MONOCYTES # BLD AUTO: 0.44 10*3/MM3 (ref 0.1–0.9)
MONOCYTES NFR BLD AUTO: 7.2 % (ref 5–12)
NEUTROPHILS NFR BLD AUTO: 3.61 10*3/MM3 (ref 1.7–7)
NEUTROPHILS NFR BLD AUTO: 58.7 % (ref 42.7–76)
NRBC BLD AUTO-RTO: 0 /100 WBC (ref 0–0.2)
PLATELET # BLD AUTO: 244 10*3/MM3 (ref 140–450)
PMV BLD AUTO: 11.2 FL (ref 6–12)
POTASSIUM SERPL-SCNC: 4.2 MMOL/L (ref 3.5–5.2)
PROT SERPL-MCNC: 7.5 G/DL (ref 6–8.5)
RBC # BLD AUTO: 4.38 10*6/MM3 (ref 3.77–5.28)
SODIUM SERPL-SCNC: 137 MMOL/L (ref 136–145)
TRIGL SERPL-MCNC: 86 MG/DL (ref 0–150)
VLDLC SERPL-MCNC: 17 MG/DL (ref 5–40)
WBC NRBC COR # BLD: 6.15 10*3/MM3 (ref 3.4–10.8)

## 2022-11-18 PROCEDURE — 82306 VITAMIN D 25 HYDROXY: CPT | Performed by: INTERNAL MEDICINE

## 2022-11-18 PROCEDURE — 86258 DGP ANTIBODY EACH IG CLASS: CPT | Performed by: INTERNAL MEDICINE

## 2022-11-18 PROCEDURE — 86038 ANTINUCLEAR ANTIBODIES: CPT | Performed by: INTERNAL MEDICINE

## 2022-11-18 PROCEDURE — 80050 GENERAL HEALTH PANEL: CPT | Performed by: INTERNAL MEDICINE

## 2022-11-18 PROCEDURE — 82784 ASSAY IGA/IGD/IGG/IGM EACH: CPT | Performed by: INTERNAL MEDICINE

## 2022-11-18 PROCEDURE — 86364 TISS TRNSGLTMNASE EA IG CLAS: CPT | Performed by: INTERNAL MEDICINE

## 2022-11-18 PROCEDURE — 99214 OFFICE O/P EST MOD 30 MIN: CPT | Performed by: INTERNAL MEDICINE

## 2022-11-18 PROCEDURE — 86225 DNA ANTIBODY NATIVE: CPT | Performed by: INTERNAL MEDICINE

## 2022-11-18 PROCEDURE — 86140 C-REACTIVE PROTEIN: CPT | Performed by: INTERNAL MEDICINE

## 2022-11-18 PROCEDURE — 80061 LIPID PANEL: CPT | Performed by: INTERNAL MEDICINE

## 2022-11-18 PROCEDURE — 86231 EMA EACH IG CLASS: CPT | Performed by: INTERNAL MEDICINE

## 2022-11-18 PROCEDURE — 85652 RBC SED RATE AUTOMATED: CPT | Performed by: INTERNAL MEDICINE

## 2022-11-18 RX ORDER — BUPROPION HYDROCHLORIDE 150 MG/1
150 TABLET ORAL EVERY MORNING
Qty: 90 TABLET | Refills: 0 | Status: SHIPPED | OUTPATIENT
Start: 2022-11-18 | End: 2023-01-17 | Stop reason: SDUPTHER

## 2022-11-18 NOTE — PROGRESS NOTES
"Chief Complaint  Follow-up (6 month ), Heartburn (GERD, follow up), Anxiety (Follow up), emotions (6 month follow up, divorce), and Diarrhea (Dealing with it for a couple months now.)    Subjective          Christal Mcduffie presents to [facility]  History of Present Illness     only needing buspar every so often  Anxiety is well controlled on current meds    Completely off her GERD medicine    However now having lower GI issues, mostly diarrhea  It fluctuates; some urgency, some looseness and some increased frequcy    Right eye with some erythema that is coming and and going.  She has tried steroid drops that help.    Objective   Vital Signs:   /74 (BP Location: Right arm, Patient Position: Sitting, Cuff Size: Adult)   Pulse 101   Temp 98.3 °F (36.8 °C)   Ht 172.7 cm (67.99\")   Wt 60.1 kg (132 lb 6.4 oz)   SpO2 98%   BMI 20.14 kg/m²     Physical Exam  Vitals reviewed.   Constitutional:       Appearance: Normal appearance. She is well-developed.   HENT:      Head: Normocephalic and atraumatic.      Right Ear: External ear normal.      Left Ear: External ear normal.   Eyes:      Conjunctiva/sclera: Conjunctivae normal.      Pupils: Pupils are equal, round, and reactive to light.   Cardiovascular:      Rate and Rhythm: Normal rate and regular rhythm.      Heart sounds: No murmur heard.    No friction rub. No gallop.   Pulmonary:      Effort: Pulmonary effort is normal.      Breath sounds: Normal breath sounds. No wheezing or rhonchi.   Skin:     General: Skin is warm and dry.   Neurological:      Mental Status: She is alert and oriented to person, place, and time.   Psychiatric:         Mood and Affect: Affect normal.         Behavior: Behavior normal.         Thought Content: Thought content normal.        Result Review :       Common labs    Common Labs 2/25/22 2/25/22 2/25/22 11/18/22 11/18/22 11/18/22    1004 1004 1004 1535 1535 1535   Glucose  106 " (A)   89    BUN  10   18    Creatinine  1.10 (A)   1.09 (A)    eGFR Non  Am  55 (A)       Sodium  140   137    Potassium  5.0   4.2    Chloride  103   101    Calcium  10.6 (A)   10.0    Albumin  4.60   4.70    Total Bilirubin  0.3   0.3    Alkaline Phosphatase  51   51    AST (SGOT)  24   22    ALT (SGPT)  24   12    WBC 4.08   6.15     Hemoglobin 13.6   13.7     Hematocrit 39.6   39.2     Platelets 257   244     Total Cholesterol   153   123   Triglycerides   59   86   HDL Cholesterol   56   46   LDL Cholesterol    85   60   (A) Abnormal value              Results for orders placed or performed in visit on 11/18/22   Comprehensive Metabolic Panel    Specimen: Arm, Left; Blood   Result Value Ref Range    Glucose 89 65 - 99 mg/dL    BUN 18 6 - 20 mg/dL    Creatinine 1.09 (H) 0.57 - 1.00 mg/dL    Sodium 137 136 - 145 mmol/L    Potassium 4.2 3.5 - 5.2 mmol/L    Chloride 101 98 - 107 mmol/L    CO2 27.9 22.0 - 29.0 mmol/L    Calcium 10.0 8.6 - 10.5 mg/dL    Total Protein 7.5 6.0 - 8.5 g/dL    Albumin 4.70 3.50 - 5.20 g/dL    ALT (SGPT) 12 1 - 33 U/L    AST (SGOT) 22 1 - 32 U/L    Alkaline Phosphatase 51 39 - 117 U/L    Total Bilirubin 0.3 0.0 - 1.2 mg/dL    Globulin 2.8 gm/dL    A/G Ratio 1.7 g/dL    BUN/Creatinine Ratio 16.5 7.0 - 25.0    Anion Gap 8.1 5.0 - 15.0 mmol/L    eGFR 65.6 >60.0 mL/min/1.73   TSH    Specimen: Arm, Left; Blood   Result Value Ref Range    TSH 1.420 0.270 - 4.200 uIU/mL   Lipid Panel    Specimen: Arm, Left; Blood   Result Value Ref Range    Total Cholesterol 123 0 - 200 mg/dL    Triglycerides 86 0 - 150 mg/dL    HDL Cholesterol 46 40 - 60 mg/dL    LDL Cholesterol  60 0 - 100 mg/dL    VLDL Cholesterol 17 5 - 40 mg/dL    LDL/HDL Ratio 1.30    Sedimentation Rate    Specimen: Arm, Left; Blood   Result Value Ref Range    Sed Rate 2 0 - 20 mm/hr   Vitamin D,25-Hydroxy    Specimen: Arm, Left; Blood   Result Value Ref Range    25 Hydroxy, Vitamin D 172.0 (H) 30.0 - 100.0 ng/ml   KARISHMA    Specimen:  Arm, Left; Blood   Result Value Ref Range    dsDNA Negative Negative    Expanded LAURE Screen Negative Negative   C-reactive protein    Specimen: Arm, Left; Blood   Result Value Ref Range    C-Reactive Protein <0.30 0.00 - 0.50 mg/dL   CBC Auto Differential    Specimen: Arm, Left; Blood   Result Value Ref Range    WBC 6.15 3.40 - 10.80 10*3/mm3    RBC 4.38 3.77 - 5.28 10*6/mm3    Hemoglobin 13.7 12.0 - 15.9 g/dL    Hematocrit 39.2 34.0 - 46.6 %    MCV 89.5 79.0 - 97.0 fL    MCH 31.3 26.6 - 33.0 pg    MCHC 34.9 31.5 - 35.7 g/dL    RDW 11.7 (L) 12.3 - 15.4 %    RDW-SD 37.2 37.0 - 54.0 fl    MPV 11.2 6.0 - 12.0 fL    Platelets 244 140 - 450 10*3/mm3    Neutrophil % 58.7 42.7 - 76.0 %    Lymphocyte % 28.1 19.6 - 45.3 %    Monocyte % 7.2 5.0 - 12.0 %    Eosinophil % 5.0 0.3 - 6.2 %    Basophil % 0.8 0.0 - 1.5 %    Immature Grans % 0.2 0.0 - 0.5 %    Neutrophils, Absolute 3.61 1.70 - 7.00 10*3/mm3    Lymphocytes, Absolute 1.73 0.70 - 3.10 10*3/mm3    Monocytes, Absolute 0.44 0.10 - 0.90 10*3/mm3    Eosinophils, Absolute 0.31 0.00 - 0.40 10*3/mm3    Basophils, Absolute 0.05 0.00 - 0.20 10*3/mm3    Immature Grans, Absolute 0.01 0.00 - 0.05 10*3/mm3    nRBC 0.0 0.0 - 0.2 /100 WBC            Procedures        Assessment and Plan    Diagnoses and all orders for this visit:    1. Anxiety (Primary)  Comments:  well controlled, cont current meds  Orders:  -     Comprehensive Metabolic Panel  -     CBC & Differential  -     TSH  -     Lipid Panel  -     KARISHMA; Future  -     Sedimentation Rate  -     C-reactive protein; Future  -     Celiac Comprehensive Panel; Future  -     KARISHMA  -     C-reactive protein  -     Celiac Comprehensive Panel    2. Uveitis  Comments:  will check labs and cont to work with eye doctor  Orders:  -     Comprehensive Metabolic Panel  -     CBC & Differential  -     TSH  -     Lipid Panel  -     KARISHMA; Future  -     Sedimentation Rate  -     C-reactive protein; Future  -     Celiac Comprehensive Panel; Future  -      KARISHMA  -     C-reactive protein  -     Celiac Comprehensive Panel    3. Diarrhea, unspecified type  Comments:  unsure of etiology; discussed could be meds, anxiety, etc  Orders:  -     Comprehensive Metabolic Panel  -     CBC & Differential  -     TSH  -     Lipid Panel  -     KARISHMA; Future  -     Sedimentation Rate  -     C-reactive protein; Future  -     Celiac Comprehensive Panel; Future  -     KARISHMA  -     C-reactive protein  -     Celiac Comprehensive Panel    4. Vitamin D deficiency  Comments:  will check labs  Orders:  -     Vitamin D,25-Hydroxy    Other orders  -     sertraline (ZOLOFT) 50 MG tablet; Take 1 tablet by mouth Daily.  Dispense: 90 tablet; Refill: 1  -     buPROPion XL (Wellbutrin XL) 150 MG 24 hr tablet; Take 1 tablet by mouth Every Morning.  Dispense: 90 tablet; Refill: 0                Follow Up   Return in about 3 months (around 2/18/2023).  Patient was given instructions and counseling regarding her condition or for health maintenance advice. Please see specific information pulled into the AVS if appropriate.

## 2022-11-19 LAB
25(OH)D3 SERPL-MCNC: 172 NG/ML (ref 30–100)
TSH SERPL DL<=0.05 MIU/L-ACNC: 1.42 UIU/ML (ref 0.27–4.2)

## 2022-11-20 LAB
DSDNA IGG SERPL IA-ACNC: NEGATIVE [IU]/ML
NUCLEAR IGG SER IA-RTO: NEGATIVE

## 2022-11-21 LAB
ENDOMYSIUM IGA SER QL: NEGATIVE
GLIADIN PEPTIDE IGA SER-ACNC: 5 UNITS (ref 0–19)
GLIADIN PEPTIDE IGG SER-ACNC: 2 UNITS (ref 0–19)
IGA SERPL-MCNC: 245 MG/DL (ref 87–352)
TTG IGA SER-ACNC: <2 U/ML (ref 0–3)
TTG IGG SER-ACNC: <2 U/ML (ref 0–5)

## 2023-01-17 RX ORDER — BUPROPION HYDROCHLORIDE 150 MG/1
150 TABLET ORAL EVERY MORNING
Qty: 90 TABLET | Refills: 0 | Status: SHIPPED | OUTPATIENT
Start: 2023-01-17 | End: 2023-02-24 | Stop reason: SDUPTHER

## 2023-02-18 ENCOUNTER — HOSPITAL ENCOUNTER (EMERGENCY)
Facility: HOSPITAL | Age: 43
Discharge: HOME | End: 2023-02-18
Attending: EMERGENCY MEDICINE | Admitting: EMERGENCY MEDICINE
Payer: COMMERCIAL

## 2023-02-18 ENCOUNTER — APPOINTMENT (OUTPATIENT)
Dept: GENERAL RADIOLOGY | Facility: HOSPITAL | Age: 43
End: 2023-02-18
Payer: COMMERCIAL

## 2023-02-18 VITALS
SYSTOLIC BLOOD PRESSURE: 115 MMHG | WEIGHT: 135.36 LBS | OXYGEN SATURATION: 100 % | TEMPERATURE: 97.9 F | DIASTOLIC BLOOD PRESSURE: 82 MMHG | HEIGHT: 68 IN | HEART RATE: 83 BPM | RESPIRATION RATE: 15 BRPM | BODY MASS INDEX: 20.52 KG/M2

## 2023-02-18 DIAGNOSIS — S61.313A LACERATION OF LEFT MIDDLE FINGER WITHOUT FOREIGN BODY WITH DAMAGE TO NAIL, INITIAL ENCOUNTER: Primary | ICD-10-CM

## 2023-02-18 PROCEDURE — 99282 EMERGENCY DEPT VISIT SF MDM: CPT

## 2023-02-18 PROCEDURE — 73140 X-RAY EXAM OF FINGER(S): CPT

## 2023-02-18 PROCEDURE — 90715 TDAP VACCINE 7 YRS/> IM: CPT | Performed by: EMERGENCY MEDICINE

## 2023-02-18 PROCEDURE — 90471 IMMUNIZATION ADMIN: CPT | Performed by: EMERGENCY MEDICINE

## 2023-02-18 PROCEDURE — 25010000002 TETANUS-DIPHTH-ACELL PERTUSSIS 5-2.5-18.5 LF-MCG/0.5 SUSPENSION PREFILLED SYRINGE: Performed by: EMERGENCY MEDICINE

## 2023-02-18 RX ORDER — LIDOCAINE HYDROCHLORIDE 10 MG/ML
10 INJECTION, SOLUTION EPIDURAL; INFILTRATION; INTRACAUDAL; PERINEURAL ONCE
Status: COMPLETED | OUTPATIENT
Start: 2023-02-18 | End: 2023-02-18

## 2023-02-18 RX ORDER — CEPHALEXIN 500 MG/1
500 CAPSULE ORAL 2 TIMES DAILY
Qty: 14 CAPSULE | Refills: 0 | Status: SHIPPED | OUTPATIENT
Start: 2023-02-18 | End: 2023-02-25

## 2023-02-18 RX ADMIN — TETANUS TOXOID, REDUCED DIPHTHERIA TOXOID AND ACELLULAR PERTUSSIS VACCINE, ADSORBED 0.5 ML: 5; 2.5; 8; 8; 2.5 SUSPENSION INTRAMUSCULAR at 15:16

## 2023-02-18 RX ADMIN — LIDOCAINE HYDROCHLORIDE 10 ML: 10 INJECTION, SOLUTION EPIDURAL; INFILTRATION; INTRACAUDAL; PERINEURAL at 17:21

## 2023-02-18 NOTE — ED PROVIDER NOTES
Time: 3:13 PM EST  Date of encounter:  2023  Independent Historian/Clinical History and Information was obtained by: Patient and Chart  Chief Complaint: laceration  History is limited by: N/A  Room number: 55/55     History of Present Illness:  HPI     Patient is a 43 y.o. year old female who presents to the Emergency Department via private car for evaluation of laceration. Patient has family at bedside on initial evaluation. Patient has a medical history of anxiety, depression, migraine, GERD, IBS. Patient has a surgical history of  section and hysterectomy.    Patient started experiencing symptoms of laceration to the left hand on the end of the 3rd digit, but states she has no additional symptoms. Patient states the injury occurred approximately a couple hours ago after slamming her finger into a sliding shower door. Patient states they are in moderate pain and rates their pain level 4 out of 10 at rest and with movement or activity. Patient states no modifying factors. Patient denies all other symptoms at this time. All other systems reviews are negative.    Patient has not tried anything for symptom relief. Patients TDAP vaccination was not up-to-date.      Patient Care Team  Primary Care Provider: Liz Milligan MD    Past Medical History:    No Known Allergies  Past Medical History:   Diagnosis Date   • Acid reflux    • Acute maxillary sinusitis 2016    recurrence not specified; will try amoxil + clinda   • Allergic rhinitis    • Anxiety    • Chronic rhinosinusitis    • Depression    • Facial aging    • GERD (gastroesophageal reflux disease)    • Hx of migraine headaches    • IBS (irritable bowel syndrome)    • Keloid    • Myofascial muscle pain    • Night sweats      Past Surgical History:   Procedure Laterality Date   • BREAST AUGMENTATION Bilateral     surgery   •  SECTION      -2013   • COLONOSCOPY     • DILATATION AND CURETTAGE      2003   • ENDOSCOPY   "2019   • HYSTERECTOMY     • KNEE SURGERY Right     [id number], right knee 6 surgeries   • OTHER SURGICAL HISTORY      joint surgery; metal implants     Family History   Problem Relation Age of Onset   • Hypertension Mother    • Osteoporosis Mother    • Heart disease Paternal Grandfather    • Colon cancer Paternal Grandfather        Home Medications:  Prior to Admission medications    Medication Sig Start Date End Date Taking? Authorizing Provider   buPROPion XL (Wellbutrin XL) 150 MG 24 hr tablet Take 1 tablet by mouth Every Morning. 1/17/23   Liz Milligan MD   busPIRone (BUSPAR) 10 MG tablet Take 1 tablet by mouth 2 (Two) Times a Day. 10/20/22   Liz Milligan MD   calcium carbonate (OS-LATESHA) 1250 (500 Ca) MG tablet Calcium 500 500 mg calcium (1,250 mg) oral tablet take 1 tablet by oral route daily   Active    ProviderDelonte MD   levocetirizine (XYZAL) 5 MG tablet Take 5 mg by mouth Daily. 1/3/22   ProviderDelonte MD   sertraline (ZOLOFT) 50 MG tablet Take 1 tablet by mouth Daily. 1/17/23   Liz Milligan MD        Social History:  Social History     Tobacco Use   • Smoking status: Never   • Smokeless tobacco: Never   Vaping Use   • Vaping Use: Never used   Substance Use Topics   • Alcohol use: Yes     Comment: social   • Drug use: Never         Review of Systems:   Review of Systems   Constitutional: Negative.    HENT: Negative.    Eyes: Negative.    Respiratory: Negative.    Cardiovascular: Negative.    Gastrointestinal: Negative.    Endocrine: Negative.    Genitourinary: Negative.    Musculoskeletal: Positive for arthralgias (left hand, 3rd digit).   Skin: Positive for wound (left hand, 3rd digit).   Allergic/Immunologic: Negative.    Neurological: Negative.    Hematological: Negative.    Psychiatric/Behavioral: Negative.         Physical Exam:   /82 (BP Location: Right arm, Patient Position: Sitting)   Pulse 83   Temp 97.9 °F (36.6 °C) (Oral)   Resp 15   Ht 172.7 cm (68\")  "  Wt 61.4 kg (135 lb 5.8 oz)   LMP  (LMP Unknown)   SpO2 100%   Breastfeeding No   BMI 20.58 kg/m²     Physical Exam  Vitals and nursing note reviewed.   Constitutional:       Appearance: Normal appearance.   HENT:      Head: Normocephalic and atraumatic.      Nose: Nose normal.   Eyes:      Extraocular Movements: Extraocular movements intact.      Conjunctiva/sclera: Conjunctivae normal.      Pupils: Pupils are equal, round, and reactive to light.   Pulmonary:      Effort: Pulmonary effort is normal. No respiratory distress.   Abdominal:      General: Abdomen is flat. There is no distension.   Musculoskeletal:         General: Normal range of motion.      Cervical back: Normal range of motion and neck supple.   Skin:     General: Skin is warm and dry.      Capillary Refill: Capillary refill takes less than 2 seconds.      Findings: Laceration (left hand, 3rd digit) present.      Comments: Left upper extremity, hand on 3rd digit: there is a 1.5 cm laceration at the distal end; there is nail involvement but unable to assess subungual hematoma due to nail polish present on the nail; digit is neurovascularly intact   Neurological:      General: No focal deficit present.      Mental Status: She is alert and oriented to person, place, and time.   Psychiatric:         Mood and Affect: Mood normal.         Behavior: Behavior normal.         Thought Content: Thought content normal.         Judgment: Judgment normal.                  Procedures:  Laceration Repair    Date/Time: 2/18/2023 5:06 PM  Performed by: Guzman Pyle PA-C  Authorized by: Jaquan Nieves DO     Consent:     Consent obtained:  Verbal    Consent given by:  Patient    Risks, benefits, and alternatives were discussed: yes      Risks discussed:  Infection, pain, poor cosmetic result and poor wound healing  Universal protocol:     Patient identity confirmed:  Verbally with patient  Anesthesia:     Anesthesia method:  Local infiltration    Local  anesthetic:  Lidocaine 1% w/o epi  Laceration details:     Location:  Finger    Finger location:  L long finger    Length (cm):  1.5  Pre-procedure details:     Preparation:  Patient was prepped and draped in usual sterile fashion and imaging obtained to evaluate for foreign bodies  Exploration:     Limited defect created (wound extended): yes      Hemostasis achieved with:  Direct pressure    Wound exploration: wound explored through full range of motion and entire depth of wound visualized      Contaminated: no    Treatment:     Area cleansed with:  Povidone-iodine    Amount of cleaning:  Standard    Irrigation solution:  Sterile water    Irrigation method:  Syringe    Visualized foreign bodies/material removed: no    Skin repair:     Repair method:  Sutures    Suture size:  5-0    Suture material:  Fast-absorbing gut    Suture technique:  Simple interrupted    Number of sutures:  3  Approximation:     Approximation:  Close  Repair type:     Repair type:  Simple  Post-procedure details:     Dressing:  Open (no dressing)    Procedure completion:  Tolerated well, no immediate complications          Medical Decision Making:    Comorbidities that affect care:    None    External Notes reviewed:    None      The following orders were placed and all results were independently analyzed by me:  Orders Placed This Encounter   Procedures   • Laceration Repair   • XR Finger 2+ View Left       Medications Given in the Emergency Department:  Medications   Tetanus-Diphth-Acell Pertussis (BOOSTRIX) injection 0.5 mL (0.5 mL Intramuscular Given 2/18/23 1516)   lidocaine PF 1% (XYLOCAINE) injection 10 mL (10 mL Injection Given 2/18/23 1721)        ED Course:         Labs:    Lab Results (last 24 hours)     ** No results found for the last 24 hours. **           Imaging:    XR Finger 2+ View Left    Result Date: 2/18/2023  PROCEDURE: XR FINGER 2+ VW LEFT  COMPARISON: None  INDICATIONS: crush injury to left 3 rd digit  FINDINGS:   There is a laceration of the distal tip of the 3rd digit.  No fracture or malalignment is seen.  No radiopaque foreign body is evident.        1. Distal 3rd digit laceration      Jarrett Ya M.D.       Electronically Signed and Approved By: Jarrett Ya M.D. on 2/18/2023 at 15:46                 Differential Diagnosis and Discussion:    Extremity Pain: Differential diagnosis includes but is not limited to soft tissue sprain, tendonitis, tendon injury, dislocation, fracture, deep vein thrombosis, arterial insufficiency, osteoarthritis, bursitis, and ligamentous damage.    All X-rays were independently reviewed by me.    MDM  Number of Diagnoses or Management Options  Laceration of left middle finger without foreign body with damage to nail, initial encounter  Diagnosis management comments: Patient presents to the emergency department for evaluation of a laceration on the left hand.  X-ray was completed and there are no acute bony abnormalities.  Laceration repair was completed with 3 sutures placed of fast-absorbing gut.       Amount and/or Complexity of Data Reviewed  Tests in the radiology section of CPT®: reviewed and ordered  Independent visualization of images, tracings, or specimens: yes    Risk of Complications, Morbidity, and/or Mortality  Presenting problems: moderate  Diagnostic procedures: low  Management options: low    Patient Progress  Patient progress: stable       Consultants/Shared Management Plan:    None    Social Determinants of Health:    Patient is independent, reliable, and has access to care.       Disposition and Care Coordination:    Discharged: The patient is suitable and stable for discharge with no need for consideration of observation or admission.    I have explained the patient´s condition, diagnoses and treatment plan based on the information available to me at this time. I have answered questions and addressed any concerns. The patient has a good  understanding of the patient´s  diagnosis, condition, and treatment plan as can be expected at this point. The vital signs have been stable. The patient´s condition is stable and appropriate for discharge from the emergency department.      The patient will pursue further outpatient evaluation with the primary care physician or other designated or consulting physician as outlined in the discharge instructions. They are agreeable to this plan of care and follow-up instructions have been explained in detail. The patient has received these instructions in written format and have expressed an understanding of the discharge instructions. The patient is aware that any significant change in condition or worsening of symptoms should prompt an immediate return to this or the closest emergency department or call to 911.    Final diagnoses:   Laceration of left middle finger without foreign body with damage to nail, initial encounter        ED Disposition     ED Disposition   Discharge    Condition   Stable    Comment   --             This medical record created using voice recognition software.    Documentation assistance provided by Kaitlin Obrien acting as scribe for Guzman Pyle PA-C. Information recorded by the scribe was done at my direction and has been verified and validated by me.      Kaitlin Obrien  02/18/23 1522       Kaitlin Obrien  02/18/23 1523       Guzman Pyle PA-C  02/19/23 0953

## 2023-02-18 NOTE — DISCHARGE INSTRUCTIONS
Please take the full course of antibiotics as prescribed.  Please keep the area clean and dry.  The sutures that were placed will absorb on their own.

## 2023-02-24 ENCOUNTER — OFFICE VISIT (OUTPATIENT)
Dept: INTERNAL MEDICINE | Facility: CLINIC | Age: 43
End: 2023-02-24
Payer: COMMERCIAL

## 2023-02-24 VITALS
OXYGEN SATURATION: 99 % | HEIGHT: 68 IN | HEART RATE: 84 BPM | WEIGHT: 132.2 LBS | TEMPERATURE: 97.7 F | RESPIRATION RATE: 18 BRPM | DIASTOLIC BLOOD PRESSURE: 70 MMHG | BODY MASS INDEX: 20.03 KG/M2 | SYSTOLIC BLOOD PRESSURE: 110 MMHG

## 2023-02-24 DIAGNOSIS — K58.0 IRRITABLE BOWEL SYNDROME WITH DIARRHEA: ICD-10-CM

## 2023-02-24 DIAGNOSIS — F41.9 ANXIETY: ICD-10-CM

## 2023-02-24 DIAGNOSIS — Z00.00 ANNUAL PHYSICAL EXAM: Primary | ICD-10-CM

## 2023-02-24 DIAGNOSIS — Z12.31 SCREENING MAMMOGRAM FOR BREAST CANCER: ICD-10-CM

## 2023-02-24 PROCEDURE — 99396 PREV VISIT EST AGE 40-64: CPT | Performed by: INTERNAL MEDICINE

## 2023-02-24 RX ORDER — LEVOCETIRIZINE DIHYDROCHLORIDE 5 MG/1
5 TABLET, FILM COATED ORAL DAILY
Qty: 90 TABLET | Refills: 1 | Status: SHIPPED | OUTPATIENT
Start: 2023-02-24 | End: 2023-03-08 | Stop reason: SDUPTHER

## 2023-02-24 RX ORDER — BUSPIRONE HYDROCHLORIDE 10 MG/1
10 TABLET ORAL 2 TIMES DAILY
Qty: 180 TABLET | Refills: 1 | Status: SHIPPED | OUTPATIENT
Start: 2023-02-24 | End: 2023-03-08 | Stop reason: SDUPTHER

## 2023-02-24 RX ORDER — IBUPROFEN 200 MG
1 CAPSULE ORAL 2 TIMES DAILY
Qty: 180 TABLET | Refills: 1 | Status: SHIPPED | OUTPATIENT
Start: 2023-02-24 | End: 2023-03-08 | Stop reason: SDUPTHER

## 2023-02-24 RX ORDER — ELUXADOLINE 75 MG/1
75 TABLET, FILM COATED ORAL DAILY
Qty: 90 TABLET | Refills: 1 | Status: SHIPPED | OUTPATIENT
Start: 2023-02-24 | End: 2023-03-08 | Stop reason: SDUPTHER

## 2023-02-24 RX ORDER — BUPROPION HYDROCHLORIDE 150 MG/1
150 TABLET ORAL EVERY MORNING
Qty: 90 TABLET | Refills: 0 | Status: SHIPPED | OUTPATIENT
Start: 2023-02-24 | End: 2023-03-08 | Stop reason: SDUPTHER

## 2023-02-24 NOTE — PROGRESS NOTES
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   Constitutional:       Appearance: Normal appearance. She is well-developed.   HENT:      Head: Normocephalic and atraumatic.      Right Ear: External ear normal.      Left Ear: External ear normal.   Eyes:      Conjunctiva/sclera: Conjunctivae normal.      Pupils: Pupils are equal, round, and reactive to light.   Cardiovascular:      Rate and Rhythm: Normal rate and regular rhythm.      Heart sounds: No murmur heard.    No friction rub. No gallop.   Pulmonary:      Effort: Pulmonary effort is normal.      Breath sounds: Normal breath sounds. No wheezing or rhonchi.   Skin:     General: Skin is warm and dry.   Neurological:      Mental Status: She is alert and oriented to person, place, and time.   Psychiatric:         Mood and Affect: Affect normal.         Behavior: Behavior normal.         Thought Content: Thought content normal.        Result Review :       Common labs    Common Labs 11/18/22 11/18/22 11/18/22    1535 1535 1535   Glucose  89    BUN  18    Creatinine  1.09 (A)    Sodium  137    Potassium  4.2    Chloride  101    Calcium  10.0    Albumin  4.70    Total Bilirubin  0.3    Alkaline Phosphatase  51    AST (SGOT)  22    ALT " (SGPT)  12    WBC 6.15     Hemoglobin 13.7     Hematocrit 39.2     Platelets 244     Total Cholesterol   123   Triglycerides   86   HDL Cholesterol   46   LDL Cholesterol    60   (A) Abnormal value              Results for orders placed or performed in visit on 11/18/22   Comprehensive Metabolic Panel    Specimen: Arm, Left; Blood   Result Value Ref Range    Glucose 89 65 - 99 mg/dL    BUN 18 6 - 20 mg/dL    Creatinine 1.09 (H) 0.57 - 1.00 mg/dL    Sodium 137 136 - 145 mmol/L    Potassium 4.2 3.5 - 5.2 mmol/L    Chloride 101 98 - 107 mmol/L    CO2 27.9 22.0 - 29.0 mmol/L    Calcium 10.0 8.6 - 10.5 mg/dL    Total Protein 7.5 6.0 - 8.5 g/dL    Albumin 4.70 3.50 - 5.20 g/dL    ALT (SGPT) 12 1 - 33 U/L    AST (SGOT) 22 1 - 32 U/L    Alkaline Phosphatase 51 39 - 117 U/L    Total Bilirubin 0.3 0.0 - 1.2 mg/dL    Globulin 2.8 gm/dL    A/G Ratio 1.7 g/dL    BUN/Creatinine Ratio 16.5 7.0 - 25.0    Anion Gap 8.1 5.0 - 15.0 mmol/L    eGFR 65.6 >60.0 mL/min/1.73   TSH    Specimen: Arm, Left; Blood   Result Value Ref Range    TSH 1.420 0.270 - 4.200 uIU/mL   Lipid Panel    Specimen: Arm, Left; Blood   Result Value Ref Range    Total Cholesterol 123 0 - 200 mg/dL    Triglycerides 86 0 - 150 mg/dL    HDL Cholesterol 46 40 - 60 mg/dL    LDL Cholesterol  60 0 - 100 mg/dL    VLDL Cholesterol 17 5 - 40 mg/dL    LDL/HDL Ratio 1.30    Sedimentation Rate    Specimen: Arm, Left; Blood   Result Value Ref Range    Sed Rate 2 0 - 20 mm/hr   Vitamin D,25-Hydroxy    Specimen: Arm, Left; Blood   Result Value Ref Range    25 Hydroxy, Vitamin D 172.0 (H) 30.0 - 100.0 ng/ml   KARISHMA    Specimen: Arm, Left; Blood   Result Value Ref Range    dsDNA Negative Negative    Expanded LAURE Screen Negative Negative   C-reactive protein    Specimen: Arm, Left; Blood   Result Value Ref Range    C-Reactive Protein <0.30 0.00 - 0.50 mg/dL   Celiac Comprehensive Panel    Specimen: Arm, Left; Blood   Result Value Ref Range    Gliadin Deamidated Peptide Ab, IgA 5 0 -  19 units    Deaminated Gliadin Ab IgG 2 0 - 19 units    Tissue Transglutaminase IgA <2 0 - 3 U/mL    Tissue Transglutaminase IgG <2 0 - 5 U/mL    Endomysial IgA Negative Negative    IgA 245 87 - 352 mg/dL   CBC Auto Differential    Specimen: Arm, Left; Blood   Result Value Ref Range    WBC 6.15 3.40 - 10.80 10*3/mm3    RBC 4.38 3.77 - 5.28 10*6/mm3    Hemoglobin 13.7 12.0 - 15.9 g/dL    Hematocrit 39.2 34.0 - 46.6 %    MCV 89.5 79.0 - 97.0 fL    MCH 31.3 26.6 - 33.0 pg    MCHC 34.9 31.5 - 35.7 g/dL    RDW 11.7 (L) 12.3 - 15.4 %    RDW-SD 37.2 37.0 - 54.0 fl    MPV 11.2 6.0 - 12.0 fL    Platelets 244 140 - 450 10*3/mm3    Neutrophil % 58.7 42.7 - 76.0 %    Lymphocyte % 28.1 19.6 - 45.3 %    Monocyte % 7.2 5.0 - 12.0 %    Eosinophil % 5.0 0.3 - 6.2 %    Basophil % 0.8 0.0 - 1.5 %    Immature Grans % 0.2 0.0 - 0.5 %    Neutrophils, Absolute 3.61 1.70 - 7.00 10*3/mm3    Lymphocytes, Absolute 1.73 0.70 - 3.10 10*3/mm3    Monocytes, Absolute 0.44 0.10 - 0.90 10*3/mm3    Eosinophils, Absolute 0.31 0.00 - 0.40 10*3/mm3    Basophils, Absolute 0.05 0.00 - 0.20 10*3/mm3    Immature Grans, Absolute 0.01 0.00 - 0.05 10*3/mm3    nRBC 0.0 0.0 - 0.2 /100 WBC            Procedures        Assessment and Plan    Diagnoses and all orders for this visit:    1. Annual physical exam (Primary)  Comments:  Counseled on routine screening    2. Anxiety  Comments:  Doing very well on current meds continue for now    3. Irritable bowel syndrome with diarrhea  Comments:  Will try Viberzi as well as possibly stopping magnesium and/or calcium and seeing if this helps  Orders:  -     Eluxadoline (Viberzi) 75 MG tablet; Take 75 mg by mouth Daily.  Dispense: 90 tablet; Refill: 1    4. Screening mammogram for breast cancer  -     Mammo Screening Digital Tomosynthesis Bilateral With CAD; Future    Other orders  -     buPROPion XL (Wellbutrin XL) 150 MG 24 hr tablet; Take 1 tablet by mouth Every Morning.  Dispense: 90 tablet; Refill: 0  -     busPIRone  (BUSPAR) 10 MG tablet; Take 1 tablet by mouth 2 (Two) Times a Day.  Dispense: 180 tablet; Refill: 1  -     calcium carbonate (OS-LATESHA) 1250 (500 Ca) MG tablet; Take 1 tablet by mouth 2 (Two) Times a Day.  Dispense: 180 tablet; Refill: 1  -     levocetirizine (XYZAL) 5 MG tablet; Take 1 tablet by mouth Daily.  Dispense: 90 tablet; Refill: 1  -     sertraline (ZOLOFT) 50 MG tablet; Take 1 tablet by mouth Daily.  Dispense: 90 tablet; Refill: 1        BMI is within normal parameters. No other follow-up for BMI required.            Follow Up   Return in about 6 months (around 8/24/2023).  Patient was given instructions and counseling regarding her condition or for health maintenance advice. Please see specific information pulled into the AVS if appropriate.

## 2023-03-08 DIAGNOSIS — K58.0 IRRITABLE BOWEL SYNDROME WITH DIARRHEA: ICD-10-CM

## 2023-03-08 NOTE — TELEPHONE ENCOUNTER
----- Message from Pina Flores MA sent at 3/7/2023  4:14 PM EST -----  Regarding: FW: Pharmacy   Contact: [phone]    ----- Message -----  From: Christal Mcduffie  Sent: 3/5/2023   7:36 AM EST  To: [facility]  Subject: Pharmacy                                         Yes. My primary pharmacy is [facility] on [address]

## 2023-03-09 ENCOUNTER — PRIOR AUTHORIZATION (OUTPATIENT)
Dept: INTERNAL MEDICINE | Facility: CLINIC | Age: 43
End: 2023-03-09
Payer: COMMERCIAL

## 2023-03-09 RX ORDER — IBUPROFEN 200 MG
1 CAPSULE ORAL 2 TIMES DAILY
Qty: 180 TABLET | Refills: 1 | Status: SHIPPED | OUTPATIENT
Start: 2023-03-09

## 2023-03-09 RX ORDER — LEVOCETIRIZINE DIHYDROCHLORIDE 5 MG/1
5 TABLET, FILM COATED ORAL DAILY
Qty: 90 TABLET | Refills: 1 | Status: SHIPPED | OUTPATIENT
Start: 2023-03-09

## 2023-03-09 RX ORDER — BUPROPION HYDROCHLORIDE 150 MG/1
150 TABLET ORAL EVERY MORNING
Qty: 90 TABLET | Refills: 0 | Status: SHIPPED | OUTPATIENT
Start: 2023-03-09

## 2023-03-09 RX ORDER — ELUXADOLINE 75 MG/1
75 TABLET, FILM COATED ORAL DAILY
Qty: 90 TABLET | Refills: 1 | Status: SHIPPED | OUTPATIENT
Start: 2023-03-09

## 2023-03-09 RX ORDER — BUSPIRONE HYDROCHLORIDE 10 MG/1
10 TABLET ORAL 2 TIMES DAILY
Qty: 180 TABLET | Refills: 1 | Status: SHIPPED | OUTPATIENT
Start: 2023-03-09

## 2023-03-09 NOTE — TELEPHONE ENCOUNTER
Message from plan: PA Case: [id number], Status: Approved, Coverage Starts on: 3/9/2023 12:00:00 AM, Coverage Ends on: 3/8/2024 12:00:00 AM.

## 2023-05-03 ENCOUNTER — TELEPHONE (OUTPATIENT)
Dept: INTERNAL MEDICINE | Facility: CLINIC | Age: 43
End: 2023-05-03
Payer: COMMERCIAL

## 2023-05-03 DIAGNOSIS — R39.15 URINARY URGENCY: Primary | ICD-10-CM

## 2023-05-03 NOTE — TELEPHONE ENCOUNTER
Caller: Christal Mcduffie    Relationship: Self    Best call back number: [phone]    What medication are you requesting: SOMETHING FOR UTI SYMPTOMS    What are your current symptoms: BLADDER SPASMS, PAINFUL AND FREQUENT URINATION, URGENCY.    How long have you been experiencing symptoms: THE LAST 12 HOURS    Have you had these symptoms before:    [x] Yes  [] No    Have you been treated for these symptoms before:   [x] Yes  [] No    If a prescription is needed, what is your preferred pharmacy and phone number: [facility] - [place], [place] - [address] - [phone] [facility] [phone]      Additional notes:  PATIENT IS NOT ABLE TO TAKE OFF WORK TO COME INTO THE OFFICE FOR A VISIT. PATIENT WOULD LIKE SOMETHING FOR A UTI IF POSSIBLE.

## 2023-05-04 ENCOUNTER — CLINICAL SUPPORT (OUTPATIENT)
Dept: INTERNAL MEDICINE | Facility: CLINIC | Age: 43
End: 2023-05-04
Payer: COMMERCIAL

## 2023-05-04 ENCOUNTER — DOCUMENTATION (OUTPATIENT)
Dept: INTERNAL MEDICINE | Facility: CLINIC | Age: 43
End: 2023-05-04
Payer: COMMERCIAL

## 2023-05-04 DIAGNOSIS — R39.15 URINARY URGENCY: Primary | ICD-10-CM

## 2023-05-04 DIAGNOSIS — R39.15 URINARY URGENCY: ICD-10-CM

## 2023-05-04 LAB
BILIRUB UR QL STRIP: NEGATIVE
CLARITY UR: ABNORMAL
COLOR UR: YELLOW
GLUCOSE UR STRIP-MCNC: NEGATIVE MG/DL
HGB UR QL STRIP.AUTO: ABNORMAL
KETONES UR QL STRIP: NEGATIVE
LEUKOCYTE ESTERASE UR QL STRIP.AUTO: ABNORMAL
NITRITE UR QL STRIP: NEGATIVE
PH UR STRIP.AUTO: 6 [PH] (ref 5–8)
PROT UR QL STRIP: NEGATIVE
SP GR UR STRIP: 1.01 (ref 1–1.03)
UROBILINOGEN UR QL STRIP: ABNORMAL

## 2023-05-04 PROCEDURE — 87186 SC STD MICRODIL/AGAR DIL: CPT | Performed by: INTERNAL MEDICINE

## 2023-05-04 PROCEDURE — 87077 CULTURE AEROBIC IDENTIFY: CPT | Performed by: INTERNAL MEDICINE

## 2023-05-04 PROCEDURE — 87086 URINE CULTURE/COLONY COUNT: CPT | Performed by: INTERNAL MEDICINE

## 2023-05-04 RX ORDER — NITROFURANTOIN 25; 75 MG/1; MG/1
100 CAPSULE ORAL 2 TIMES DAILY
Qty: 10 CAPSULE | Refills: 0 | Status: SHIPPED | OUTPATIENT
Start: 2023-05-04 | End: 2023-05-09

## 2023-05-04 NOTE — PROGRESS NOTES
Pt dropped off urine sample today w/ abnormal UA. Sent for micro and culture.     Called pt to review symptoms.   Endorses urgency and sensation of bladder spasms which started yesterday. She is also having fever w/ tmax of 101. She does not have a hx of recurrent UTI. She is s/p hysterectomy.     UA + for leuk and blood.   Macrobid sent in empirically, discussed potential need to change abx pending culture results.   Hematuria most likely related to cystitis. Discussed repeating UA in the future post infection to ensure hematuria is resolved.     Ellen Hsu MD

## 2023-05-04 NOTE — PROGRESS NOTES
UTI Protocol       1.) Christal Mcduffie, 1980, presents to the clinic with Frequency     2.) Duration: 1 day    3.) History of Frequent UTIs? no    4.) History of yeast infections with antibiotics? yes    5.) Any use of OTC medications to treat UTI symptoms such as Azo? yes    6.) Any allergies to antibiotics? no    No Known Allergies    7.) Antibiotic use in the last 3 months? no    If yes what antibiotic? NA      Provider Consulted: Dr. Ellen Hsu     Provider Instructions: sent for culture.      Rachana Ramirez LPN  05/04/23, 16:42 EDT

## 2023-05-06 LAB — BACTERIA SPEC AEROBE CULT: ABNORMAL

## 2023-06-09 ENCOUNTER — DOCUMENTATION (OUTPATIENT)
Dept: OBSTETRICS AND GYNECOLOGY | Facility: CLINIC | Age: 43
End: 2023-06-09
Payer: COMMERCIAL

## 2023-06-09 RX ORDER — AMOXICILLIN 500 MG/1
500 CAPSULE ORAL 2 TIMES DAILY
Qty: 20 CAPSULE | Refills: 0 | Status: SHIPPED | OUTPATIENT
Start: 2023-06-09

## 2023-06-09 RX ORDER — FLUCONAZOLE 150 MG/1
150 TABLET ORAL DAILY
Qty: 7 TABLET | Refills: 0 | Status: SHIPPED | OUTPATIENT
Start: 2023-06-09 | End: 2023-06-16

## 2023-06-09 NOTE — PROGRESS NOTES
Pt called c/o sinus pressure and green drainage. Pt states she gets sinusitis occasionally. Script for amoxicillin sent to pharmacy

## 2023-07-27 RX ORDER — BUPROPION HYDROCHLORIDE 150 MG/1
150 TABLET ORAL EVERY MORNING
Qty: 90 TABLET | Refills: 0 | Status: SHIPPED | OUTPATIENT
Start: 2023-07-27

## 2023-08-02 ENCOUNTER — HOSPITAL ENCOUNTER (OUTPATIENT)
Dept: MAMMOGRAPHY | Facility: HOSPITAL | Age: 43
Discharge: HOME | End: 2023-08-02
Admitting: INTERNAL MEDICINE
Payer: COMMERCIAL

## 2023-08-02 DIAGNOSIS — Z12.31 SCREENING MAMMOGRAM FOR BREAST CANCER: ICD-10-CM

## 2023-08-02 PROCEDURE — 77063 BREAST TOMOSYNTHESIS BI: CPT

## 2023-08-02 PROCEDURE — 77067 SCR MAMMO BI INCL CAD: CPT

## 2023-08-07 ENCOUNTER — DOCUMENTATION (OUTPATIENT)
Dept: OBSTETRICS AND GYNECOLOGY | Facility: CLINIC | Age: 43
End: 2023-08-07
Payer: COMMERCIAL

## 2023-08-07 RX ORDER — FLUCONAZOLE 150 MG/1
150 TABLET ORAL DAILY
Qty: 7 TABLET | Refills: 0 | Status: SHIPPED | OUTPATIENT
Start: 2023-08-07 | End: 2023-08-14

## 2023-08-07 RX ORDER — AMOXICILLIN AND CLAVULANATE POTASSIUM 875; 125 MG/1; MG/1
1 TABLET, FILM COATED ORAL 2 TIMES DAILY
Qty: 14 TABLET | Refills: 0 | Status: SHIPPED | OUTPATIENT
Start: 2023-08-07 | End: 2023-08-14

## 2023-08-25 ENCOUNTER — OFFICE VISIT (OUTPATIENT)
Dept: INTERNAL MEDICINE | Facility: CLINIC | Age: 43
End: 2023-08-25
Payer: COMMERCIAL

## 2023-08-25 VITALS
HEIGHT: 68 IN | BODY MASS INDEX: 20 KG/M2 | OXYGEN SATURATION: 99 % | HEART RATE: 74 BPM | SYSTOLIC BLOOD PRESSURE: 104 MMHG | WEIGHT: 132 LBS | TEMPERATURE: 97.5 F | DIASTOLIC BLOOD PRESSURE: 68 MMHG

## 2023-08-25 DIAGNOSIS — L23.89 ALLERGIC CONTACT DERMATITIS DUE TO OTHER AGENTS: ICD-10-CM

## 2023-08-25 DIAGNOSIS — F41.9 ANXIETY: Primary | ICD-10-CM

## 2023-08-25 PROCEDURE — 99214 OFFICE O/P EST MOD 30 MIN: CPT | Performed by: INTERNAL MEDICINE

## 2023-08-25 RX ORDER — BUPROPION HYDROCHLORIDE 150 MG/1
150 TABLET ORAL EVERY MORNING
Qty: 90 TABLET | Refills: 0 | Status: SHIPPED | OUTPATIENT
Start: 2023-08-25

## 2023-08-25 RX ORDER — BUSPIRONE HYDROCHLORIDE 10 MG/1
10 TABLET ORAL 2 TIMES DAILY
Qty: 180 TABLET | Refills: 1 | Status: SHIPPED | OUTPATIENT
Start: 2023-08-25

## 2023-08-25 RX ORDER — SYRINGE WITH NEEDLE, 1 ML 28GX1/2"
SYRINGE, EMPTY DISPOSABLE MISCELLANEOUS
COMMUNITY
Start: 2023-08-19

## 2023-08-25 NOTE — PROGRESS NOTES
"Chief Complaint  Anxiety (6 month f/u- Pt would like to see about changing dose on medication)    Subjective      Christal Mcduffie presents to [facility]  History of Present Illness    States that she feels like her anxiety is worsening recently  Mostly for the last 4-6 weeks  She has been having to take more buspirone    Notes that she does have a lot more going on at work    Objective   Vital Signs:   /68 (BP Location: Right arm, Patient Position: Sitting, Cuff Size: Small Adult)   Pulse 74   Temp 97.5 øF (36.4 øC) (Temporal)   Ht 172.7 cm (68\")   Wt 59.9 kg (132 lb)   SpO2 99%   BMI 20.07 kg/mý     Wt Readings from Last 3 Encounters:   08/25/23 59.9 kg (132 lb)   02/24/23 60 kg (132 lb 3.2 oz)   02/18/23 61.4 kg (135 lb 5.8 oz)     BP Readings from Last 3 Encounters:   08/25/23 104/68   02/24/23 110/70   02/18/23 115/82         Physical Exam  Vitals reviewed.   Constitutional:       Appearance: Normal appearance. She is well-developed.   HENT:      Head: Normocephalic and atraumatic.      Right Ear: External ear normal.      Left Ear: External ear normal.   Eyes:      Conjunctiva/sclera: Conjunctivae normal.      Pupils: Pupils are equal, round, and reactive to light.   Cardiovascular:      Rate and Rhythm: Normal rate and regular rhythm.      Heart sounds: No murmur heard.    No friction rub. No gallop.   Pulmonary:      Effort: Pulmonary effort is normal.      Breath sounds: Normal breath sounds. No wheezing or rhonchi.   Skin:     General: Skin is warm and dry.   Neurological:      Mental Status: She is alert and oriented to person, place, and time.   Psychiatric:         Mood and Affect: Affect normal.         Behavior: Behavior normal.         Thought Content: Thought content normal.        Result Review :  The following data was reviewed by: Liz Milligan MD on 08/25/2023:    LABS      Common labs          11/18/2022    15:35   Common " Labs   Glucose 89    BUN 18    Creatinine 1.09    Sodium 137    Potassium 4.2    Chloride 101    Calcium 10.0    Albumin 4.70    Total Bilirubin 0.3    Alkaline Phosphatase 51    AST (SGOT) 22    ALT (SGPT) 12    WBC 6.15    Hemoglobin 13.7    Hematocrit 39.2    Platelets 244    Total Cholesterol 123    Triglycerides 86    HDL Cholesterol 46    LDL Cholesterol  60        No visits with results within 1 Month(s) from this visit.   Latest known visit with results is:   Clinical Support on 05/04/2023   Component Date Value    Color, UA 05/04/2023 Yellow     Appearance, UA 05/04/2023 Cloudy (A)     pH, UA 05/04/2023 6.0     Specific Gravity, UA 05/04/2023 1.010     Glucose, UA 05/04/2023 Negative     Ketones, UA 05/04/2023 Negative     Bilirubin, UA 05/04/2023 Negative     Blood, UA 05/04/2023 Large (3+) (A)     Protein, UA 05/04/2023 Negative     Leuk Esterase, UA 05/04/2023 Small (1+) (A)     Nitrite, UA 05/04/2023 Negative     Urobilinogen, UA 05/04/2023 0.2 E.U./dL     Urine Culture 05/04/2023 >100,000 CFU/mL Escherichia coli (A)        Lab Results   Component Value Date    COVID19 NOT DETECTED 12/31/2020    BILIRUBINUR Negative 05/04/2023        IMAGING  Mammo Screening Digital Tomosynthesis Bilateral With CAD    Result Date: 8/3/2023   Benign mammogram. Suggest routine mammographic screening.  RECOMMENDATION(S):  ROUTINE MAMMOGRAM AND CLINICAL EVALUATION IN 12 MONTHS.   BIRADS:  DIAGNOSTIC CATEGORY 1--NEGATIVE.   BREAST COMPOSITION: Heterogeneously dense,which may obscure small masses.  PLEASE NOTE:  A NORMAL MAMMOGRAM DOES NOT EXCLUDE THE POSSIBILITY OF BREAST CANCER. ANY CLINICALLY SUSPICIOUS PALPABLE LUMP SHOULD BE BIOPSIED.      RIP PRESLEY MD       Electronically Signed and Approved By: RIP PRESLEY MD on 8/03/2023 at 9:31               Procedures         HEALTH MAINTENANCE   BMI is within normal parameters. No other follow-up for BMI required.       Social History     Tobacco Use   Smoking Status Never  "  Smokeless Tobacco Never              Assessment and Plan   Diagnoses and all orders for this visit:    1. Anxiety (Primary)    2. Allergic contact dermatitis due to other agents    Other orders  -     buPROPion XL (Wellbutrin XL) 150 MG 24 hr tablet; Take 1 tablet by mouth Every Morning.  Dispense: 90 tablet; Refill: 0  -     busPIRone (BUSPAR) 10 MG tablet; Take 1 tablet by mouth 2 (Two) Times a Day.  Dispense: 180 tablet; Refill: 1  -     sertraline (ZOLOFT) 50 MG tablet; Take 1 tablet by mouth Daily.  Dispense: 90 tablet; Refill: 1              FOLLOW UP  Return in about 6 months (around 2/25/2024).  Patient was given instructions and counseling regarding her condition or for health maintenance advice. Please see specific information pulled into the AVS if appropriate.       Liz Milligan MD  08/25/23  15:39 EDT    CURRENT & DISCONTINUED MEDICATIONS  Current Outpatient Medications   Medication Instructions    BD Allergist Tray 27G X 1/2\" 1 ML kit To use if allergy injection every other week    buPROPion XL (WELLBUTRIN XL) 150 mg, Oral, Every Morning    busPIRone (BUSPAR) 10 mg, Oral, 2 Times Daily    calcium carbonate (OS-LATESHA) 1,250 mg, Oral, 2 Times Daily    levocetirizine (XYZAL) 5 mg, Oral, Daily    sertraline (ZOLOFT) 50 mg, Oral, Daily       Medications Discontinued During This Encounter   Medication Reason    amoxicillin (AMOXIL) 500 MG capsule *Therapy completed    Eluxadoline (Viberzi) 75 MG tablet     busPIRone (BUSPAR) 10 MG tablet Reorder    sertraline (ZOLOFT) 50 MG tablet Reorder    buPROPion XL (Wellbutrin XL) 150 MG 24 hr tablet Reorder            "

## 2023-08-25 NOTE — PROGRESS NOTES
"Chief Complaint  Anxiety (6 month f/u- Pt would like to see about changing dose on medication)    Subjective      Christal Mcduffie presents to [facility]  History of Present Illness    States that she has noticed a little more anxiety recently  For about the past 4 weeks  Had been doing really well prior to this  Has been having to increase her BuSpar    Stomach is doing much better since she cut down on her caffeine intake    Has also noticed that she does have a little bit of throat irritation after having a URI  Does admit that she has not been doing her allergy shots quite as much as she had been previously    Reviewed recent labs    Objective   Vital Signs:   /68 (BP Location: Right arm, Patient Position: Sitting, Cuff Size: Small Adult)   Pulse 74   Temp 97.5 øF (36.4 øC) (Temporal)   Ht 172.7 cm (68\")   Wt 59.9 kg (132 lb)   SpO2 99%   BMI 20.07 kg/mý     Wt Readings from Last 3 Encounters:   08/25/23 59.9 kg (132 lb)   02/24/23 60 kg (132 lb 3.2 oz)   02/18/23 61.4 kg (135 lb 5.8 oz)     BP Readings from Last 3 Encounters:   08/25/23 104/68   02/24/23 110/70   02/18/23 115/82         Physical Exam  Vitals reviewed.   Constitutional:       Appearance: Normal appearance. She is well-developed.   HENT:      Head: Normocephalic and atraumatic.      Right Ear: External ear normal.      Left Ear: External ear normal.   Eyes:      Conjunctiva/sclera: Conjunctivae normal.      Pupils: Pupils are equal, round, and reactive to light.   Cardiovascular:      Rate and Rhythm: Normal rate and regular rhythm.      Heart sounds: No murmur heard.    No friction rub. No gallop.   Pulmonary:      Effort: Pulmonary effort is normal.      Breath sounds: Normal breath sounds. No wheezing or rhonchi.   Skin:     General: Skin is warm and dry.   Neurological:      Mental Status: She is alert and oriented to person, place, and time.   Psychiatric:         Mood and " Affect: Affect normal.         Behavior: Behavior normal.         Thought Content: Thought content normal.        Result Review :  The following data was reviewed by: Liz Milligan MD on 08/25/2023:    LABS      Common labs          11/18/2022    15:35   Common Labs   Glucose 89    BUN 18    Creatinine 1.09    Sodium 137    Potassium 4.2    Chloride 101    Calcium 10.0    Albumin 4.70    Total Bilirubin 0.3    Alkaline Phosphatase 51    AST (SGOT) 22    ALT (SGPT) 12    WBC 6.15    Hemoglobin 13.7    Hematocrit 39.2    Platelets 244    Total Cholesterol 123    Triglycerides 86    HDL Cholesterol 46    LDL Cholesterol  60        No visits with results within 1 Month(s) from this visit.   Latest known visit with results is:   Clinical Support on 05/04/2023   Component Date Value    Color, UA 05/04/2023 Yellow     Appearance, UA 05/04/2023 Cloudy (A)     pH, UA 05/04/2023 6.0     Specific Gravity, UA 05/04/2023 1.010     Glucose, UA 05/04/2023 Negative     Ketones, UA 05/04/2023 Negative     Bilirubin, UA 05/04/2023 Negative     Blood, UA 05/04/2023 Large (3+) (A)     Protein, UA 05/04/2023 Negative     Leuk Esterase, UA 05/04/2023 Small (1+) (A)     Nitrite, UA 05/04/2023 Negative     Urobilinogen, UA 05/04/2023 0.2 E.U./dL     Urine Culture 05/04/2023 >100,000 CFU/mL Escherichia coli (A)        Lab Results   Component Value Date    COVID19 NOT DETECTED 12/31/2020    BILIRUBINUR Negative 05/04/2023        IMAGING  Mammo Screening Digital Tomosynthesis Bilateral With CAD    Result Date: 8/3/2023   Benign mammogram. Suggest routine mammographic screening.  RECOMMENDATION(S):  ROUTINE MAMMOGRAM AND CLINICAL EVALUATION IN 12 MONTHS.   BIRADS:  DIAGNOSTIC CATEGORY 1--NEGATIVE.   BREAST COMPOSITION: Heterogeneously dense,which may obscure small masses.  PLEASE NOTE:  A NORMAL MAMMOGRAM DOES NOT EXCLUDE THE POSSIBILITY OF BREAST CANCER. ANY CLINICALLY SUSPICIOUS PALPABLE LUMP SHOULD BE BIOPSIED.      RIP PRESLEY MD    "    Electronically Signed and Approved By: RIP PRESLEY MD on 8/03/2023 at 9:31               Procedures         HEALTH MAINTENANCE   BMI is within normal parameters. No other follow-up for BMI required.       Social History     Tobacco Use   Smoking Status Never   Smokeless Tobacco Never              Assessment and Plan   Diagnoses and all orders for this visit:    1. Anxiety (Primary)  Comments:  For now will try taking BuSpar more regularly however could consider increasing Zoloft to 75 mg or 100 mg    2. Allergic contact dermatitis due to other agents  Comments:  We will restart allergy shots more regularly and see how she does    Other orders  -     buPROPion XL (Wellbutrin XL) 150 MG 24 hr tablet; Take 1 tablet by mouth Every Morning.  Dispense: 90 tablet; Refill: 0  -     busPIRone (BUSPAR) 10 MG tablet; Take 1 tablet by mouth 2 (Two) Times a Day.  Dispense: 180 tablet; Refill: 1  -     sertraline (ZOLOFT) 50 MG tablet; Take 1 tablet by mouth Daily.  Dispense: 90 tablet; Refill: 1              FOLLOW UP  Return in about 6 months (around 2/25/2024).  Patient was given instructions and counseling regarding her condition or for health maintenance advice. Please see specific information pulled into the AVS if appropriate.       Liz Milligan MD  08/25/23  15:46 EDT    CURRENT & DISCONTINUED MEDICATIONS  Current Outpatient Medications   Medication Instructions    BD Allergist Tray 27G X 1/2\" 1 ML kit To use if allergy injection every other week    buPROPion XL (WELLBUTRIN XL) 150 mg, Oral, Every Morning    busPIRone (BUSPAR) 10 mg, Oral, 2 Times Daily    calcium carbonate (OS-LATESHA) 1,250 mg, Oral, 2 Times Daily    levocetirizine (XYZAL) 5 mg, Oral, Daily    sertraline (ZOLOFT) 50 mg, Oral, Daily       Medications Discontinued During This Encounter   Medication Reason    amoxicillin (AMOXIL) 500 MG capsule *Therapy completed    Eluxadoline (Viberzi) 75 MG tablet     busPIRone (BUSPAR) 10 MG tablet Reorder    " sertraline (ZOLOFT) 50 MG tablet Reorder    buPROPion XL (Wellbutrin XL) 150 MG 24 hr tablet Reorder

## 2024-01-08 RX ORDER — BUSPIRONE HYDROCHLORIDE 10 MG/1
10 TABLET ORAL 2 TIMES DAILY
Qty: 180 TABLET | Refills: 1 | Status: SHIPPED | OUTPATIENT
Start: 2024-01-08

## 2024-02-23 RX ORDER — BUPROPION HYDROCHLORIDE 150 MG/1
150 TABLET ORAL EVERY MORNING
Qty: 90 TABLET | Refills: 0 | Status: SHIPPED | OUTPATIENT
Start: 2024-02-23

## 2024-05-06 RX ORDER — BUSPIRONE HYDROCHLORIDE 10 MG/1
10 TABLET ORAL 2 TIMES DAILY
Qty: 180 TABLET | Refills: 1 | Status: SHIPPED | OUTPATIENT
Start: 2024-05-06

## 2024-05-31 RX ORDER — BUPROPION HYDROCHLORIDE 150 MG/1
150 TABLET ORAL EVERY MORNING
Qty: 90 TABLET | Refills: 0 | Status: SHIPPED | OUTPATIENT
Start: 2024-05-31

## 2024-08-02 ENCOUNTER — OFFICE VISIT (OUTPATIENT)
Dept: INTERNAL MEDICINE | Facility: CLINIC | Age: 44
End: 2024-08-02
Payer: COMMERCIAL

## 2024-08-02 ENCOUNTER — TRANSCRIBE ORDERS (OUTPATIENT)
Dept: ADMINISTRATIVE | Facility: HOSPITAL | Age: 44
End: 2024-08-02
Payer: COMMERCIAL

## 2024-08-02 VITALS
OXYGEN SATURATION: 97 % | RESPIRATION RATE: 18 BRPM | SYSTOLIC BLOOD PRESSURE: 108 MMHG | TEMPERATURE: 98.2 F | DIASTOLIC BLOOD PRESSURE: 66 MMHG | HEART RATE: 86 BPM | HEIGHT: 68 IN | WEIGHT: 137 LBS | BODY MASS INDEX: 20.76 KG/M2

## 2024-08-02 DIAGNOSIS — Z13.220 SCREENING, LIPID: ICD-10-CM

## 2024-08-02 DIAGNOSIS — F41.9 ANXIETY: ICD-10-CM

## 2024-08-02 DIAGNOSIS — Z12.31 VISIT FOR SCREENING MAMMOGRAM: Primary | ICD-10-CM

## 2024-08-02 DIAGNOSIS — Z13.9 SCREENING DUE: ICD-10-CM

## 2024-08-02 DIAGNOSIS — Z12.11 ENCOUNTER FOR SCREENING COLONOSCOPY: ICD-10-CM

## 2024-08-02 DIAGNOSIS — Z00.00 ANNUAL PHYSICAL EXAM: Primary | ICD-10-CM

## 2024-08-02 LAB
ALBUMIN SERPL-MCNC: 4.6 G/DL (ref 3.5–5.2)
ALBUMIN/GLOB SERPL: 1.6 G/DL
ALP SERPL-CCNC: 43 U/L (ref 39–117)
ALT SERPL W P-5'-P-CCNC: 22 U/L (ref 1–33)
ANION GAP SERPL CALCULATED.3IONS-SCNC: 7.4 MMOL/L (ref 5–15)
AST SERPL-CCNC: 26 U/L (ref 1–32)
BASOPHILS # BLD AUTO: 0.03 10*3/MM3 (ref 0–0.2)
BASOPHILS NFR BLD AUTO: 0.5 % (ref 0–1.5)
BILIRUB SERPL-MCNC: 0.3 MG/DL (ref 0–1.2)
BUN SERPL-MCNC: 14 MG/DL (ref 6–20)
BUN/CREAT SERPL: 13.5 (ref 7–25)
CALCIUM SPEC-SCNC: 9.9 MG/DL (ref 8.6–10.5)
CHLORIDE SERPL-SCNC: 102 MMOL/L (ref 98–107)
CHOLEST SERPL-MCNC: 88 MG/DL (ref 0–200)
CO2 SERPL-SCNC: 29.6 MMOL/L (ref 22–29)
CREAT SERPL-MCNC: 1.04 MG/DL (ref 0.57–1)
DEPRECATED RDW RBC AUTO: 40.5 FL (ref 37–54)
EGFRCR SERPLBLD CKD-EPI 2021: 68.5 ML/MIN/1.73
EOSINOPHIL # BLD AUTO: 0.18 10*3/MM3 (ref 0–0.4)
EOSINOPHIL NFR BLD AUTO: 2.8 % (ref 0.3–6.2)
ERYTHROCYTE [DISTWIDTH] IN BLOOD BY AUTOMATED COUNT: 11.8 % (ref 12.3–15.4)
GLOBULIN UR ELPH-MCNC: 2.9 GM/DL
GLUCOSE SERPL-MCNC: 93 MG/DL (ref 65–99)
HCT VFR BLD AUTO: 44 % (ref 34–46.6)
HDLC SERPL-MCNC: 22 MG/DL (ref 40–60)
HGB BLD-MCNC: 14.7 G/DL (ref 12–15.9)
IMM GRANULOCYTES # BLD AUTO: 0.02 10*3/MM3 (ref 0–0.05)
IMM GRANULOCYTES NFR BLD AUTO: 0.3 % (ref 0–0.5)
LDLC SERPL CALC-MCNC: 56 MG/DL (ref 0–100)
LDLC/HDLC SERPL: 2.68 {RATIO}
LYMPHOCYTES # BLD AUTO: 1.51 10*3/MM3 (ref 0.7–3.1)
LYMPHOCYTES NFR BLD AUTO: 23.4 % (ref 19.6–45.3)
MCH RBC QN AUTO: 31.4 PG (ref 26.6–33)
MCHC RBC AUTO-ENTMCNC: 33.4 G/DL (ref 31.5–35.7)
MCV RBC AUTO: 94 FL (ref 79–97)
MONOCYTES # BLD AUTO: 0.38 10*3/MM3 (ref 0.1–0.9)
MONOCYTES NFR BLD AUTO: 5.9 % (ref 5–12)
NEUTROPHILS NFR BLD AUTO: 4.34 10*3/MM3 (ref 1.7–7)
NEUTROPHILS NFR BLD AUTO: 67.1 % (ref 42.7–76)
NRBC BLD AUTO-RTO: 0 /100 WBC (ref 0–0.2)
PLATELET # BLD AUTO: 254 10*3/MM3 (ref 140–450)
PMV BLD AUTO: 10.7 FL (ref 6–12)
POTASSIUM SERPL-SCNC: 4.6 MMOL/L (ref 3.5–5.2)
PROT SERPL-MCNC: 7.5 G/DL (ref 6–8.5)
RBC # BLD AUTO: 4.68 10*6/MM3 (ref 3.77–5.28)
SODIUM SERPL-SCNC: 139 MMOL/L (ref 136–145)
TRIGL SERPL-MCNC: 35 MG/DL (ref 0–150)
TSH SERPL DL<=0.05 MIU/L-ACNC: 0.89 UIU/ML (ref 0.27–4.2)
VLDLC SERPL-MCNC: 10 MG/DL (ref 5–40)
WBC NRBC COR # BLD AUTO: 6.46 10*3/MM3 (ref 3.4–10.8)

## 2024-08-02 PROCEDURE — 99396 PREV VISIT EST AGE 40-64: CPT | Performed by: INTERNAL MEDICINE

## 2024-08-02 PROCEDURE — 80050 GENERAL HEALTH PANEL: CPT | Performed by: INTERNAL MEDICINE

## 2024-08-02 PROCEDURE — 36415 COLL VENOUS BLD VENIPUNCTURE: CPT | Performed by: INTERNAL MEDICINE

## 2024-08-02 PROCEDURE — 80061 LIPID PANEL: CPT | Performed by: INTERNAL MEDICINE

## 2024-08-02 RX ORDER — BUPROPION HYDROCHLORIDE 150 MG/1
150 TABLET ORAL EVERY MORNING
Qty: 90 TABLET | Refills: 0 | Status: SHIPPED | OUTPATIENT
Start: 2024-08-02

## 2024-08-02 NOTE — PROGRESS NOTES
"Chief Complaint  Anxiety (Follow up)      Subjective      History of Present Illness  The patient presents for evaluation of multiple medical concerns.    She reports an increased frequency of buspirone use, which she finds helpful. Her current medication regimen includes Zoloft 50 mg at night, Wellbutrin 150 mg in the morning, and BuSpar as needed. She denies experiencing any palpitations or chest pain. She has scheduled her mammogram today. She had her first colonoscopy 5 years ago, with a recommendation for a follow-up every 5 years. She has ceased counseling and feels well without it.         Objective   Vital Signs:   Vitals:    08/02/24 1005   BP: 108/66   BP Location: Left arm   Patient Position: Sitting   Cuff Size: Adult   Pulse: 86   Resp: 18   Temp: 98.2 °F (36.8 °C)   TempSrc: Temporal   SpO2: 97%   Weight: 62.1 kg (137 lb)   Height: 172.7 cm (67.99\")     Body mass index is 20.84 kg/m².    Wt Readings from Last 3 Encounters:   08/02/24 62.1 kg (137 lb)   08/25/23 59.9 kg (132 lb)   02/24/23 60 kg (132 lb 3.2 oz)     BP Readings from Last 3 Encounters:   08/02/24 108/66   08/25/23 104/68   02/24/23 110/70       Health Maintenance   Topic Date Due    ANNUAL PHYSICAL  02/24/2024    INFLUENZA VACCINE  08/01/2024    COLORECTAL CANCER SCREENING  11/01/2024    COVID-19 Vaccine (3 - 2023-24 season) 09/01/2024 (Originally 9/1/2023)    MAMMOGRAM  08/02/2025    TDAP/TD VACCINES (3 - Td or Tdap) 02/18/2033    HEPATITIS C SCREENING  Completed    Pneumococcal Vaccine 0-64  Aged Out       Physical Exam  Vitals reviewed.   Constitutional:       Appearance: Normal appearance. She is well-developed.   HENT:      Head: Normocephalic and atraumatic.      Right Ear: External ear normal.      Left Ear: External ear normal.   Eyes:      Conjunctiva/sclera: Conjunctivae normal.      Pupils: Pupils are equal, round, and reactive to light.   Cardiovascular:      Rate and Rhythm: Normal rate and regular rhythm.      Heart sounds: " No murmur heard.     No friction rub. No gallop.   Pulmonary:      Effort: Pulmonary effort is normal.      Breath sounds: Normal breath sounds. No wheezing or rhonchi.   Skin:     General: Skin is warm and dry.   Neurological:      Mental Status: She is alert and oriented to person, place, and time.   Psychiatric:         Mood and Affect: Affect normal.         Behavior: Behavior normal.         Thought Content: Thought content normal.        Physical Exam        Result Review :  The following data was reviewed by: Liz Milligan MD on 08/02/2024:         Results      BMI is within normal parameters. No other follow-up for BMI required.       Procedures            Assessment & Plan  Encounter for screening colonoscopy    Screening due    Anxiety    Doing great on current meds, cont for now  Screening, lipid        Annual physical exam  Annual routine blood work, including CBC, CMP, thyroid screening, and cholesterol panel, was ordered. She was advised to receive the influenza vaccine later in the season, typically in September or October of 2024.  Counseled on continuing good self care    Orders Placed This Encounter   Procedures    Comprehensive Metabolic Panel    TSH    Lipid Panel    Ambulatory Referral to Gastroenterology    CBC & Differential     New Medications Ordered This Visit   Medications    buPROPion XL (Wellbutrin XL) 150 MG 24 hr tablet     Sig: Take 1 tablet by mouth Every Morning.     Dispense:  90 tablet     Refill:  0     Patient will need to keep current appointment to receive further refills          Assessment & Plan      Patient or patient representative verbalized consent for the use of Ambient Listening during the visit with  Liz Milligan MD for chart documentation. 8/2/2024  10:56 EDT      FOLLOW UP  Return in about 6 months (around 2/2/2025).  Patient was given instructions and counseling regarding her condition or for health maintenance advice. Please see specific information  "pulled into the AVS if appropriate.     Liz Milligan MD  08/02/24  11:06 EDT    CURRENT & DISCONTINUED MEDICATIONS  Current Outpatient Medications   Medication Instructions    buPROPion XL (WELLBUTRIN XL) 150 mg, Oral, Every Morning    busPIRone (BUSPAR) 10 mg, Oral, 2 Times Daily    calcium carbonate (OS-LATESHA) 1,250 mg, Oral, 2 Times Daily    levocetirizine (XYZAL) 5 mg, Oral, Daily    sertraline (ZOLOFT) 50 mg, Oral, Daily       Medications Discontinued During This Encounter   Medication Reason    BD Allergist Tray 27G X 1/2\" 1 ML kit *Therapy completed    buPROPion XL (Wellbutrin XL) 150 MG 24 hr tablet Reorder          "

## 2024-10-02 RX ORDER — BUSPIRONE HYDROCHLORIDE 10 MG/1
10 TABLET ORAL 2 TIMES DAILY
Qty: 180 TABLET | Refills: 1 | Status: SHIPPED | OUTPATIENT
Start: 2024-10-02

## 2024-10-03 ENCOUNTER — HOSPITAL ENCOUNTER (OUTPATIENT)
Dept: MAMMOGRAPHY | Facility: HOSPITAL | Age: 44
Discharge: HOME | End: 2024-10-03
Admitting: INTERNAL MEDICINE
Payer: COMMERCIAL

## 2024-10-03 DIAGNOSIS — Z12.31 VISIT FOR SCREENING MAMMOGRAM: ICD-10-CM

## 2024-10-03 PROCEDURE — 77063 BREAST TOMOSYNTHESIS BI: CPT

## 2024-10-03 PROCEDURE — 77067 SCR MAMMO BI INCL CAD: CPT

## 2024-12-06 ENCOUNTER — DOCUMENTATION (OUTPATIENT)
Dept: OBSTETRICS AND GYNECOLOGY | Facility: CLINIC | Age: 44
End: 2024-12-06
Payer: COMMERCIAL

## 2024-12-23 ENCOUNTER — PATIENT MESSAGE (OUTPATIENT)
Dept: INTERNAL MEDICINE | Facility: CLINIC | Age: 44
End: 2024-12-23
Payer: COMMERCIAL

## 2024-12-27 RX ORDER — DOXYCYCLINE 100 MG/1
100 CAPSULE ORAL 2 TIMES DAILY
Qty: 20 CAPSULE | Refills: 0 | Status: SHIPPED | OUTPATIENT
Start: 2024-12-27 | End: 2025-01-06

## 2025-02-27 ENCOUNTER — TELEPHONE (OUTPATIENT)
Dept: GASTROENTEROLOGY | Facility: CLINIC | Age: 45
End: 2025-02-27
Payer: COMMERCIAL

## 2025-02-27 NOTE — TELEPHONE ENCOUNTER
Last colonoscopy 11/8/19    Personal hx polyps    Family hx polyps    Family hx of cx    ASA or blood thinners:  None    List medications:  Sertraline  Wellbutrin xl  Buspirone  Magnesium  Progesterone    OA form scanned in media

## 2025-03-02 ENCOUNTER — PREP FOR SURGERY (OUTPATIENT)
Dept: OTHER | Facility: HOSPITAL | Age: 45
End: 2025-03-02
Payer: COMMERCIAL

## 2025-03-02 DIAGNOSIS — Z12.11 ENCOUNTER FOR SCREENING FOR MALIGNANT NEOPLASM OF COLON: Primary | ICD-10-CM

## 2025-03-03 ENCOUNTER — TELEPHONE (OUTPATIENT)
Dept: GASTROENTEROLOGY | Facility: CLINIC | Age: 45
End: 2025-03-03
Payer: COMMERCIAL

## 2025-03-04 ENCOUNTER — TELEPHONE (OUTPATIENT)
Dept: GASTROENTEROLOGY | Facility: CLINIC | Age: 45
End: 2025-03-04
Payer: COMMERCIAL

## 2025-03-05 ENCOUNTER — TELEPHONE (OUTPATIENT)
Dept: GASTROENTEROLOGY | Facility: CLINIC | Age: 45
End: 2025-03-05
Payer: COMMERCIAL

## 2025-03-26 RX ORDER — BUPROPION HYDROCHLORIDE 150 MG/1
150 TABLET ORAL EVERY MORNING
Qty: 90 TABLET | Refills: 1 | Status: SHIPPED | OUTPATIENT
Start: 2025-03-26

## 2025-04-15 ENCOUNTER — TELEPHONE (OUTPATIENT)
Dept: GASTROENTEROLOGY | Facility: CLINIC | Age: 45
End: 2025-04-15

## 2025-04-15 NOTE — TELEPHONE ENCOUNTER
Hub staff attempted to follow warm transfer process and was unsuccessful     Caller: Christal Mcduffie    Relationship to patient: Self    Best call back number: [phone]    Patient is needing: PATIENT NEEDING TO SCHEDULE SCOPE.  PLEASE REACH OUT TO SCHEDULE. THANK YOU

## 2025-04-16 ENCOUNTER — TELEPHONE (OUTPATIENT)
Dept: GASTROENTEROLOGY | Facility: CLINIC | Age: 45
End: 2025-04-16
Payer: COMMERCIAL

## 2025-04-28 ENCOUNTER — TELEPHONE (OUTPATIENT)
Dept: GASTROENTEROLOGY | Facility: CLINIC | Age: 45
End: 2025-04-28

## 2025-04-28 NOTE — TELEPHONE ENCOUNTER
Hub staff attempted to follow warm transfer process and was unsuccessful     Caller: ABDIRAHMAN CHRISTIANSEN    Relationship to patient: SELF    Best call back number: [phone]    Patient is needing: PT WANTING TO SCHEDULE COLONOSCOPY. ATTEMPTED TO WT. PT SAID TEXTING WOULD BE BEST.

## 2025-04-29 NOTE — TELEPHONE ENCOUNTER
Hub staff attempted to follow warm transfer process and was unsuccessful     Caller: Christal Mcduffie    Relationship to patient: Self    Best call back number: [phone]    Patient is needing: PT ATTEMPTING TO CALL AGAIN TO GET SCOPE SCHEDULED

## 2025-05-01 ENCOUNTER — TELEPHONE (OUTPATIENT)
Dept: GASTROENTEROLOGY | Facility: CLINIC | Age: 45
End: 2025-05-01
Payer: COMMERCIAL

## 2025-05-16 ENCOUNTER — TELEPHONE (OUTPATIENT)
Dept: GASTROENTEROLOGY | Facility: CLINIC | Age: 45
End: 2025-05-16
Payer: COMMERCIAL

## 2025-05-16 ENCOUNTER — OFFICE VISIT (OUTPATIENT)
Dept: INTERNAL MEDICINE | Facility: CLINIC | Age: 45
End: 2025-05-16
Payer: COMMERCIAL

## 2025-05-16 VITALS
OXYGEN SATURATION: 98 % | SYSTOLIC BLOOD PRESSURE: 120 MMHG | HEIGHT: 68 IN | HEART RATE: 66 BPM | DIASTOLIC BLOOD PRESSURE: 68 MMHG | WEIGHT: 136.4 LBS | TEMPERATURE: 97.8 F | RESPIRATION RATE: 18 BRPM | BODY MASS INDEX: 20.67 KG/M2

## 2025-05-16 DIAGNOSIS — N95.1 PERIMENOPAUSE: ICD-10-CM

## 2025-05-16 DIAGNOSIS — K64.9 HEMORRHOIDS, UNSPECIFIED HEMORRHOID TYPE: Primary | ICD-10-CM

## 2025-05-16 DIAGNOSIS — F41.9 ANXIETY: ICD-10-CM

## 2025-05-16 PROCEDURE — 99214 OFFICE O/P EST MOD 30 MIN: CPT | Performed by: INTERNAL MEDICINE

## 2025-05-16 NOTE — PROGRESS NOTES
"Chief Complaint  Anxiety (Follow up ), Hemorrhoids (Flared in April ), perimenopause (Would like to discuss symptoms), and problem with referral  (Has not been able to get colonoscopy scheduled )      Subjective      History of Present Illness  The patient presents for evaluation of hemorrhoids and perimenopausal symptoms.    She experienced a severe flare-up of hemorrhoids during the first week of 08/2024. Despite discomfort, she walked approximately 20,000 steps daily. Upon returning, she sustained a saddle injury while cleaning, exacerbating her condition. Hemorrhoids have since improved significantly, with occasional discomfort during bowel movements. No constipation, improved diarrhea with reduced caffeine intake. Difficulty scheduling colonoscopy, curious about banding procedure during colonoscopy. No refill of Anusol needed.    She began experiencing frequent hot flashes and night sweats in 12/2024 or 01/2025. Prescribed progesterone by a nurse practitioner, beneficial with increased dosage. Recently switched to extended-release formulation of 300 mg, improving sleep and reducing hot flashes. Reports irritability and low energy, attributed to hormonal fluctuations. Anxiety returned to baseline this week. Continues to exercise. Blood work, including FSH, on 12/05/2024. Partial hysterectomy.    SOCIAL HISTORY  - Reduced caffeine intake           Objective   Vital Signs:   Vitals:    05/16/25 1443   BP: 120/68   BP Location: Left arm   Patient Position: Sitting   Cuff Size: Adult   Pulse: 66   Resp: 18   Temp: 97.8 °F (36.6 °C)   TempSrc: Temporal   SpO2: 98%   Weight: 61.9 kg (136 lb 6.4 oz)   Height: 172.7 cm (67.99\")     Body mass index is 20.74 kg/m².    Wt Readings from Last 3 Encounters:   05/16/25 61.9 kg (136 lb 6.4 oz)   08/02/24 62.1 kg (137 lb)   08/25/23 59.9 kg (132 lb)     BP Readings from Last 3 Encounters:   05/16/25 120/68   08/02/24 108/66   08/25/23 104/68       Health Maintenance   Topic Date " Due    COVID-19 Vaccine (3 - 2024-25 season) 09/01/2024    COLORECTAL CANCER SCREENING  11/01/2024    INFLUENZA VACCINE  07/01/2025    ANNUAL PHYSICAL  08/02/2025    MAMMOGRAM  10/03/2026    TDAP/TD VACCINES (3 - Td or Tdap) 02/18/2033    HEPATITIS C SCREENING  Completed    Pneumococcal Vaccine 0-49  Aged Out       Physical Exam  Vitals reviewed.   Constitutional:       Appearance: Normal appearance. She is well-developed.   HENT:      Head: Normocephalic and atraumatic.      Right Ear: External ear normal.      Left Ear: External ear normal.   Eyes:      Conjunctiva/sclera: Conjunctivae normal.      Pupils: Pupils are equal, round, and reactive to light.   Cardiovascular:      Rate and Rhythm: Normal rate and regular rhythm.      Heart sounds: No murmur heard.     No friction rub. No gallop.   Pulmonary:      Effort: Pulmonary effort is normal.      Breath sounds: Normal breath sounds. No wheezing or rhonchi.   Skin:     General: Skin is warm and dry.   Neurological:      Mental Status: She is alert and oriented to person, place, and time.   Psychiatric:         Mood and Affect: Affect normal.         Behavior: Behavior normal.         Thought Content: Thought content normal.        Physical Exam        Result Review :  The following data was reviewed by: Liz Milligan MD on 05/16/2025:         Results  Reviewed most recent labs    BMI is within normal parameters. No other follow-up for BMI required.       Procedures            Assessment & Plan  Hemorrhoids, unspecified hemorrhoid type         Perimenopause         Anxiety              Assessment & Plan  Hemorrhoids  - Significant flare-up in August, possibly thrombosed  - Over-the-counter Anusol provided relief after 10 days  - Symptoms significantly improved, occasional discomfort during bowel movements  - Referral to Dr. Sutton for potential banding procedures if symptoms persist    Perimenopausal symptoms  - Experiencing hot flashes and night sweats since  12/2024 or 01/2025  - Progesterone prescribed, beneficial with increased dosage  - Mood swings, low energy levels, elevated FSH indicating menopause or nearing it  - Normal cholesterol levels  - Discussed estrogen therapy, including estrogen patch with progesterone pills  - Prescription for estrogen patch and progesterone pills provided      Patient or patient representative verbalized consent for the use of Ambient Listening during the visit with  Liz Milligan MD for chart documentation. 5/18/2025  23:15 EDT      FOLLOW UP  Return in about 3 months (around 8/16/2025).  Patient was given instructions and counseling regarding her condition or for health maintenance advice. Please see specific information pulled into the AVS if appropriate.     Liz Milligan MD  05/18/25  23:15 EDT    CURRENT & DISCONTINUED MEDICATIONS  Current Outpatient Medications   Medication Instructions    buPROPion XL (WELLBUTRIN XL) 150 mg, Oral, Every Morning    busPIRone (BUSPAR) 10 mg, Oral, 2 Times Daily    calcium carbonate (OS-LATESHA) 1,250 mg, Oral, 2 Times Daily    levocetirizine (XYZAL) 5 mg, Oral, Daily    sertraline (ZOLOFT) 50 mg, Oral, Daily       There are no discontinued medications.

## 2025-05-18 RX ORDER — PROGESTERONE 100 MG/1
100 CAPSULE ORAL
Qty: 90 CAPSULE | Refills: 1 | Status: SHIPPED | OUTPATIENT
Start: 2025-05-18

## 2025-05-18 RX ORDER — ESTRADIOL 0.05 MG/D
1 PATCH, EXTENDED RELEASE TRANSDERMAL 2 TIMES WEEKLY
Qty: 24 PATCH | Refills: 1 | Status: SHIPPED | OUTPATIENT
Start: 2025-05-19

## 2025-05-20 ENCOUNTER — TELEPHONE (OUTPATIENT)
Dept: GASTROENTEROLOGY | Facility: CLINIC | Age: 45
End: 2025-05-20

## 2025-05-20 NOTE — TELEPHONE ENCOUNTER
Caller: Christal Mcduffie    Relationship to patient: Self    Best call back number: [phone]    Chief complaint: WORK    Type of visit: SCOPE    Requested date:  06.13.25 OR 08.01.25    If rescheduling, when is the original appointment: 05.30.24      Additional notes:PT IS CALLING TO CANCEL/RESCHEDULE SCOPE. PT HAS A WORK CONFLICT. PLEASE ADVISE PT VIA [employer] ABOUT RESCHEDULING ALSO ON THE REQUESTED DATES.

## 2025-06-10 NOTE — TELEPHONE ENCOUNTER
Patient returned call to the office and LVM asking for a call back to schedule scope. Patient is hoping there is availability this Friday or Friday August 1, 2025. Please call patient back to discuss. Call back number is [phone]

## 2025-06-11 ENCOUNTER — TELEPHONE (OUTPATIENT)
Dept: GASTROENTEROLOGY | Facility: CLINIC | Age: 45
End: 2025-06-11
Payer: COMMERCIAL

## 2025-08-29 ENCOUNTER — ANESTHESIA (OUTPATIENT)
Dept: GASTROENTEROLOGY | Facility: HOSPITAL | Age: 45
End: 2025-08-29
Payer: COMMERCIAL

## 2025-08-29 ENCOUNTER — ANESTHESIA EVENT (OUTPATIENT)
Dept: GASTROENTEROLOGY | Facility: HOSPITAL | Age: 45
End: 2025-08-29
Payer: COMMERCIAL

## 2025-08-29 PROCEDURE — 25010000002 PROPOFOL 200 MG/20ML EMULSION: Performed by: ANESTHESIOLOGY

## 2025-08-29 PROCEDURE — 25010000002 PROPOFOL 1000 MG/100ML EMULSION: Performed by: ANESTHESIOLOGY

## 2025-08-29 PROCEDURE — 25010000002 LIDOCAINE 2% SOLUTION: Performed by: ANESTHESIOLOGY

## 2025-08-29 RX ORDER — PROPOFOL 10 MG/ML
INJECTION, EMULSION INTRAVENOUS CONTINUOUS PRN
Status: DISCONTINUED | OUTPATIENT
Start: 2025-08-29 | End: 2025-08-29 | Stop reason: SURG

## 2025-08-29 RX ORDER — LIDOCAINE HYDROCHLORIDE 20 MG/ML
INJECTION, SOLUTION INFILTRATION; PERINEURAL AS NEEDED
Status: DISCONTINUED | OUTPATIENT
Start: 2025-08-29 | End: 2025-08-29 | Stop reason: SURG

## 2025-08-29 RX ORDER — PROPOFOL 10 MG/ML
INJECTION, EMULSION INTRAVENOUS AS NEEDED
Status: DISCONTINUED | OUTPATIENT
Start: 2025-08-29 | End: 2025-08-29 | Stop reason: SURG

## 2025-08-29 RX ADMIN — LIDOCAINE HYDROCHLORIDE 60 MG: 20 INJECTION, SOLUTION INFILTRATION; PERINEURAL at 07:33

## 2025-08-29 RX ADMIN — PROPOFOL INJECTABLE EMULSION 100 MG: 10 INJECTION, EMULSION INTRAVENOUS at 07:33

## 2025-08-29 RX ADMIN — PROPOFOL 140 MCG/KG/MIN: 10 INJECTION, EMULSION INTRAVENOUS at 07:34
